# Patient Record
Sex: FEMALE | Race: ASIAN | NOT HISPANIC OR LATINO | Employment: FULL TIME | URBAN - METROPOLITAN AREA
[De-identification: names, ages, dates, MRNs, and addresses within clinical notes are randomized per-mention and may not be internally consistent; named-entity substitution may affect disease eponyms.]

---

## 2020-10-27 ENCOUNTER — OFFICE VISIT (OUTPATIENT)
Dept: OBGYN | Facility: CLINIC | Age: 30
End: 2020-10-27
Payer: COMMERCIAL

## 2020-10-27 VITALS
HEART RATE: 68 BPM | WEIGHT: 104.4 LBS | SYSTOLIC BLOOD PRESSURE: 86 MMHG | BODY MASS INDEX: 19.71 KG/M2 | HEIGHT: 61 IN | DIASTOLIC BLOOD PRESSURE: 50 MMHG

## 2020-10-27 DIAGNOSIS — R10.2 FEMALE PELVIC PAIN: Primary | ICD-10-CM

## 2020-10-27 PROCEDURE — 99203 OFFICE O/P NEW LOW 30 MIN: CPT | Performed by: OBSTETRICS & GYNECOLOGY

## 2020-10-27 RX ORDER — NORGESTIMATE AND ETHINYL ESTRADIOL 0.25-0.035
1 KIT ORAL DAILY
Qty: 90 TABLET | Refills: 4 | Status: SHIPPED | OUTPATIENT
Start: 2020-10-27 | End: 2021-10-19 | Stop reason: SINTOL

## 2020-10-27 SDOH — HEALTH STABILITY: MENTAL HEALTH: HOW OFTEN DO YOU HAVE 6 OR MORE DRINKS ON ONE OCCASION?: NOT ASKED

## 2020-10-27 SDOH — HEALTH STABILITY: MENTAL HEALTH: HOW MANY STANDARD DRINKS CONTAINING ALCOHOL DO YOU HAVE ON A TYPICAL DAY?: NOT ASKED

## 2020-10-27 SDOH — HEALTH STABILITY: MENTAL HEALTH: HOW OFTEN DO YOU HAVE A DRINK CONTAINING ALCOHOL?: NOT ASKED

## 2020-10-27 ASSESSMENT — MIFFLIN-ST. JEOR: SCORE: 1130.94

## 2020-10-27 NOTE — PROGRESS NOTES
SUBJECTIVE:                                                   Marie Ball is a 29 year old female who presents to clinic today for the following health issue(s):  Patient presents with:  Pelvic Pain: Patient had pelvic pain and had an MRI completed through [facility]. Patient is new to our clinic and wishes to seek OBGYN regarding pelvic pain.       Additional information: Had an MRI done at [facility] on 10/5/2020.     HPI:  Patient has been having pelvic pain for 8-9 months.  She was having more menstrual cramping as well, dysmenorrhea.  She has increased cramping 2 days prior to bleeding.  She has a cycle every 27 days.  She is having bleeding for 4-5 days total including day of spotting.  She has never been on birth control.      She has a partner and not trying to get pregnant.  She has been having unprotected sex for 1-2 years.  She is having some pain with intercourse as well.     She has never been pregnant.  She has never had gyn surgery.    Patient's last menstrual period was 10/23/2020..     Patient is sexually active, No obstetric history on file..  Using none for contraception.    reports that she has never smoked. She has never used smokeless tobacco.    STD testing offered?  Declined    Health maintenance updated:  no, due for pap smear    Today's PHQ-2 Score: No flowsheet data found.  Today's PHQ-9 Score: No flowsheet data found.  Today's TAE-7 Score: No flowsheet data found.    Problem list and histories reviewed & adjusted, as indicated.  Additional history: as documented.    There is no problem list on file for this patient.    Past Surgical History:   Procedure Laterality Date     NO HISTORY OF SURGERY        Social History     Tobacco Use     Smoking status: Never Smoker     Smokeless tobacco: Never Used   Substance Use Topics     Alcohol use: Not Currently      Problem (# of Occurrences) Relation (Name,Age of Onset)    Throat cancer (1) Paternal Grandmother            No current  "outpatient medications on file.     No current facility-administered medications for this visit.      No Known Allergies    ROS:  12 point review of systems negative other than symptoms noted below or in the HPI.  Genitourinary: Pelvic Pain  No urinary frequency or dysuria, bladder or kidney problems      OBJECTIVE:     BP (!) 86/50 (BP Location: Right arm, Patient Position: Sitting, Cuff Size: Adult Regular)   Pulse 68   Ht 1.549 m (5' 1\")   Wt 47.4 kg (104 lb 6.4 oz)   LMP 10/23/2020   Breastfeeding No   BMI 19.73 kg/m    Body mass index is 19.73 kg/m .    Exam:  Constitutional:  Appearance: Well nourished, well developed alert, in no acute distress  Chest:  Respiratory Effort:  Breathing unlabored.   Cardiovascular: no edema    In-Clinic Test Results:  No results found for this or any previous visit (from the past 24 hour(s)).    ASSESSMENT/PLAN:                                                        ICD-10-CM    1. Female pelvic pain  R10.2 US Transvaginal Non OB       There are no Patient Instructions on file for this visit.    1. US to reassess her cyst that was present in September.  MRI was concerning for possible endometriosis/endometrioma.  She isn't on birth control    2. Discussed endometriosis and long term follow up.  Discussed infertility and tubal structures.   Discussed birth control suppression to help with pain and long term.  Discussed  Keeping on birth control until she desires conception.  Discussed increased risk of ectopic and pregnancy.          Kika Breen MD  [facility] [place]  "

## 2020-11-06 ENCOUNTER — NURSE TRIAGE (OUTPATIENT)
Dept: NURSING | Facility: CLINIC | Age: 30
End: 2020-11-06

## 2020-11-06 NOTE — TELEPHONE ENCOUNTER
10/27/20 OV w Dr Breen:   2. Discussed endometriosis and long term follow up.  Discussed infertility and tubal structures.   Discussed birth control suppression to help with pain and long term.  Discussed  Keeping on birth control until she desires conception.  Discussed increased risk of ectopic and pregnancy.   Kika Breen MD    Called and left a detailed vm that Dr Breen intended continuous suppression to help w pain. So take active pills daily, skipping the placebo week.  Call w questions.  Geno Perry RN on 11/6/2020 at 12:05 PM

## 2020-11-06 NOTE — TELEPHONE ENCOUNTER
RN triage   Call from pt   Pt states she was seen last week and prescribed medication and told to start medication on Sunday morning   Pt started medication on Sunday evening 11/2 -- and taking med Q day in the evening   Pt states the pharmacist asked her if she is supposed to take this medication when she is having her period  Pt wants to know if it is OK to take med when having her period?    Pt states also = has had nausea since taking medication -- off and on -- when thinking about certain foods -- no vomiting   Pt states she is drinking fluids and U.O. OK   Please advise   Daphney Doran RN  [facility]  Triage Nurse Advisor      Additional Information    Caller has NON-URGENT medication question about med that PCP prescribed and triager unable to answer question    Protocols used: MEDICATION QUESTION CALL-A-[place]

## 2020-11-19 NOTE — PROGRESS NOTES
"  SUBJECTIVE:                                                   Marie Ball is a 30 year old female who presents to clinic today for the following health issue(s):  Patient presents with:  Ultrasound: follow-up      HPI:  Patient with history of endometriosis.  She was having persistent pelvic pain and not on suppression.  She had a persistent endometrioma on the left ovary.  She hasn't had tubal evaluation.  She isn't trying for pregnancy at this time.      She had some nausea with the ocps.  She isn't having any other side effects at this time.      Patient's last menstrual period was 10/23/2020..   Patient is sexually active, .  Using oral contraceptive for contraception.    reports that she has never smoked. She has never used smokeless tobacco.  Health maintenance updated:  Due for Pap    Today's PHQ-2 Score: No flowsheet data found.  Today's PHQ-9 Score: No flowsheet data found.  Today's TAE-7 Score: No flowsheet data found.    Problem list and histories reviewed & adjusted, as indicated.  Additional history: as documented.    There is no problem list on file for this patient.    Past Surgical History:   Procedure Laterality Date     NO HISTORY OF SURGERY        Social History     Tobacco Use     Smoking status: Never Smoker     Smokeless tobacco: Never Used   Substance Use Topics     Alcohol use: Not Currently      Problem (# of Occurrences) Relation (Name,Age of Onset)    Throat cancer (1) Paternal Grandmother            Current Outpatient Medications   Medication Sig     norgestimate-ethinyl estradiol (SPRINTEC 28) 0.25-35 MG-MCG tablet Take 1 tablet by mouth daily     No current facility-administered medications for this visit.      No Known Allergies    ROS:  12 point review of systems negative other than symptoms noted below or in the HPI.  Genitourinary: Pelvic Pain  No urinary frequency or dysuria, bladder or kidney problems      OBJECTIVE:     BP 90/60   Ht 1.549 m (5' 1\")   Wt 46.7 kg " (103 lb)   LMP 10/23/2020   BMI 19.46 kg/m    Body mass index is 19.46 kg/m .    Exam:  Constitutional:  Appearance: Well nourished, well developed alert, in no acute distress  Chest:  Respiratory Effort:  Breathing unlabored.  Cardiovascular: Heart: Auscultation: no edema    In-Clinic Test Results:  Results for orders placed or performed in visit on 10/27/20 (from the past 24 hour(s))   US Transvaginal Non OB    Narrative    Gynecological Ultrasound Report  Pelvic U/S - Transvaginal  [facility]  Referring Provider: Kika Villareal MD  Sonographer:  Tonja Medina RDMS  Indication: Pain- Pelvic pain Right  LMP: Patient's last menstrual period was 10/23/2020.  History:   Gynecological Ultrasonography:   Uterus: anteverted. Contour is smooth/regular.  Size: 5.60 x 3.86 x 3.26 cm  Endometrium: Thickness Total 5.96 mm  Findings: Normal  Right Ovary: 1.84 x 1.62 x .96 cm. Wnl  Left Ovary: 2.56 x 2.33 x 1.99 cm. Left hyperechoic mass possible   endometrioma 1.9x 1.9x 1.7cm  Cul de Sac Free Fluid: No free fluid     Impression:    Possible endometrioma, <2 cm    Kika Breen MD             ASSESSMENT/PLAN:                                                        ICD-10-CM    1. Female pelvic pain  R10.2        There are no Patient Instructions on file for this visit.    1 History of endometriosis- she started on ocps at this time.  Discussed her ultrasound findings.  She has a persistent endometrioma.  Discussed the suppression will help with her endometriosis  2. Discussed fallopian tubes and infertility.  Discussed that if she can't conceive then we would check the fallopian tubes with HSG and would refer to IVF before doing in clinic cycles due to her history of endometriosis.    3. Discussed follow up planning.    Kika Breen MD  [facility] [place]

## 2020-11-20 ENCOUNTER — OFFICE VISIT (OUTPATIENT)
Dept: OBGYN | Facility: CLINIC | Age: 30
End: 2020-11-20
Attending: OBSTETRICS & GYNECOLOGY
Payer: COMMERCIAL

## 2020-11-20 ENCOUNTER — ANCILLARY PROCEDURE (OUTPATIENT)
Dept: ULTRASOUND IMAGING | Facility: CLINIC | Age: 30
End: 2020-11-20
Attending: OBSTETRICS & GYNECOLOGY
Payer: COMMERCIAL

## 2020-11-20 VITALS
DIASTOLIC BLOOD PRESSURE: 60 MMHG | BODY MASS INDEX: 19.45 KG/M2 | SYSTOLIC BLOOD PRESSURE: 90 MMHG | WEIGHT: 103 LBS | HEIGHT: 61 IN

## 2020-11-20 DIAGNOSIS — R10.2 FEMALE PELVIC PAIN: Primary | ICD-10-CM

## 2020-11-20 PROCEDURE — 76830 TRANSVAGINAL US NON-OB: CPT | Performed by: OBSTETRICS & GYNECOLOGY

## 2020-11-20 PROCEDURE — 99213 OFFICE O/P EST LOW 20 MIN: CPT | Performed by: OBSTETRICS & GYNECOLOGY

## 2020-11-20 ASSESSMENT — MIFFLIN-ST. JEOR: SCORE: 1119.58

## 2020-11-23 ENCOUNTER — TELEPHONE (OUTPATIENT)
Dept: OBGYN | Facility: CLINIC | Age: 30
End: 2020-11-23

## 2020-11-23 NOTE — TELEPHONE ENCOUNTER
Pt stating that she is having some bleeding after starting new OCP 3 weeks ago.  Not sure if it's a real period as it's lighter than what she normally has.    Informed pt that break through bleeding is common in the first 3 months when starting a new OCP.  It will take about that long for the body to adjust.    Told pt to call if still having breakthrough bleeding after having been taking this OCP after 3 months.    Pt verbalized understanding, in agreement with plan, and voiced no further questions.    Nora Blair RN on 11/23/2020 at 2:16 PM

## 2020-11-23 NOTE — TELEPHONE ENCOUNTER
Patient is experiencing a period after being on birth control for 3 weeks.  She is wondering if that is normal.  Please give her a call back.    Thank you

## 2021-01-09 ENCOUNTER — HEALTH MAINTENANCE LETTER (OUTPATIENT)
Age: 31
End: 2021-01-09

## 2021-04-05 NOTE — PROGRESS NOTES
Marie Ball is a 30 year old female who is being evaluated via a billable telephone visit.      What phone number would you like to be contacted at? [phone]  How would you like to obtain your AVS? Nayeli      SUBJECTIVE:                                                   Marie Ball is a 30 year old female who presents for virtual visit today for the following health issue(s):  Patient presents with:  Follow Up: discuss cysts and OCP's      HPI:  Patient reports that she has been on ocps. She has been getting her cycles on the ocps.  She has been on them for 6 months.  She is taking the placebo week.    She has history of endometrioma.  She was put on ocps for control.      Her cycles have been better controlled.  She has had lighter cycles.  She reports that her pain and cramping was improved.    She would like to conceive soon.                No LMP recorded..   Patient is sexually active, .  Using oral contraceptives for contraception.    reports that she has never smoked. She has never used smokeless tobacco.  Health maintenance updated:  Due for Pap    Today's PHQ-2 Score: No flowsheet data found.  Today's PHQ-9 Score: No flowsheet data found.  Today's TAE-7 Score: No flowsheet data found.    Problem list and histories reviewed & adjusted, as indicated.  Additional history: as documented.    There is no problem list on file for this patient.    Past Surgical History:   Procedure Laterality Date     NO HISTORY OF SURGERY        Social History     Tobacco Use     Smoking status: Never Smoker     Smokeless tobacco: Never Used   Substance Use Topics     Alcohol use: Not Currently      Problem (# of Occurrences) Relation (Name,Age of Onset)    Throat cancer (1) Paternal Grandmother            Current Outpatient Medications   Medication Sig     norgestimate-ethinyl estradiol (SPRINTEC 28) 0.25-35 MG-MCG tablet Take 1 tablet by mouth daily     No current facility-administered medications for this  visit.      No Known Allergies      OBJECTIVE:     No vitals were obtained today due to virtual visit.    Physical Exam  GENERAL: mentation appropriate        ASSESSMENT/PLAN:                                                      Phone call duration: 20 minutes      ICD-10-CM    1. Female pelvic pain  R10.2    2. Endometrioma  N80.9 US Transvaginal Non OB   3. Encounter for birth control pills maintenance  Z30.41        There are no Patient Instructions on file for this visit.    1. Birth control- discussed can skip the placebo week, and will possibly prevent bleeding.    2. Endometriosis- she has improved pain on the pills.  We will re-image the endometrioma on pelvic ultrasound.  Previously had discussed infertility evaluation, to assess her tubes when she wants to conceive.  Discussed HSG,  Discussed why we would do that with endometriosis.  Discussed sometimes the tubes can be blocked and can evaluate them that way.  3.  We will have her come in in the next 2-3 months will check ovaries.  Reviewed her records.    4. Discussed endometriosis and cyst sizes.  Discussed endometriosis and pregnancy.  Typically people have less symptoms.  Discussed timing of stopping pills and intercourse.  Discussed intercourse and timing for conception.    Kika Breen MD  [facility] [place]

## 2021-04-06 ENCOUNTER — VIRTUAL VISIT (OUTPATIENT)
Dept: OBGYN | Facility: CLINIC | Age: 31
End: 2021-04-06
Payer: COMMERCIAL

## 2021-04-06 DIAGNOSIS — Z30.41 ENCOUNTER FOR BIRTH CONTROL PILLS MAINTENANCE: ICD-10-CM

## 2021-04-06 DIAGNOSIS — R10.2 FEMALE PELVIC PAIN: Primary | ICD-10-CM

## 2021-04-06 DIAGNOSIS — N80.129 ENDOMETRIOMA: ICD-10-CM

## 2021-04-06 PROCEDURE — 99213 OFFICE O/P EST LOW 20 MIN: CPT | Mod: 95 | Performed by: OBSTETRICS & GYNECOLOGY

## 2021-07-01 NOTE — PROGRESS NOTES
SUBJECTIVE:                                                   Marie Ball is a 30 year old female who presents to clinic today for the following health issue(s):  Patient presents with:  Ultrasound: follow-up    Additional information: patient is taking pill continuously and has been having spotting    HPI:  Patient with endometrioma.  She is on birth control pills, she is having regular spotting on the pills.  She was taking the sprintec.    Ultrasound today showed stable endometrioma.    Her  and her are planning to try to conceive in a year or so.  She is thinking quarter 1-2.        No LMP recorded. (Menstrual status: Irregular Periods)..   Patient is sexually active, .  Using oral contraceptives for contraception.   reports that she has never smoked. She has never used smokeless tobacco.  Health maintenance updated:  Needs annual exam with pap    Today's PHQ-2 Score:   PHQ-2 (  [employer]) 2021   Q1: Little interest or pleasure in doing things 0   Q2: Feeling down, depressed or hopeless 0   PHQ-2 Score 0     Problem list and histories reviewed & adjusted, as indicated.  Additional history: as documented.    There is no problem list on file for this patient.    Past Surgical History:   Procedure Laterality Date     NO HISTORY OF SURGERY        Social History     Tobacco Use     Smoking status: Never Smoker     Smokeless tobacco: Never Used   Substance Use Topics     Alcohol use: Not Currently      Problem (# of Occurrences) Relation (Name,Age of Onset)    Throat cancer (1) Paternal Grandmother            Current Outpatient Medications   Medication Sig     levonorgestrel-ethinyl estradiol (SEASONALE) 0.15-0.03 MG tablet Take 1 tablet by mouth daily     norgestimate-ethinyl estradiol (SPRINTEC 28) 0.25-35 MG-MCG tablet Take 1 tablet by mouth daily     No current facility-administered medications for this visit.      No Known Allergies    ROS:  12 point review of systems negative other than  "symptoms noted below or in the HPI.  Genitourinary: Irregular Menses  No urinary frequency or dysuria, bladder or kidney problems      OBJECTIVE:     /60   Ht 1.549 m (5' 1\")   Wt 51.3 kg (113 lb)   BMI 21.35 kg/m    Body mass index is 21.35 kg/m .    Exam:  Constitutional:  Appearance: Well nourished, well developed alert, in no acute distress     In-Clinic Test Results:  Results for orders placed or performed in visit on 04/06/21 (from the past 24 hour(s))   US Transvaginal Non OB    Narrative    Gynecological Ultrasound Report  Pelvic U/S - Transvaginal  [facility]  Referring Provider: Kika Breen MD  Sonographer:  Nain Steel RDMS  Indication: Endometrioma and pelvic pain  LMP: Patient unsure of LMP - still bleeding  History:   Gynecological Ultrasonography:   Uterus: anteverted. Contour is smooth/regular.  Size: 5.8 x 3.9 x 3.1 cm  Endometrium: Thickness Total 2.2 mm  Findings:   Right Ovary: 1.2 x 1.4 x 1.9 cm. Wnl  Left Ovary: 2.3 x 2.5 x 2.0 cm. Probable endometrioma 1.9 x 1.5 x 1.8 cm  Cul de Sac Free Fluid: No free fluid     Impression:    Stable cyst, likely endometrioma     Kika Breen MD               ASSESSMENT/PLAN:                                                        ICD-10-CM    1. Endometrioma of ovary  N80.1 levonorgestrel-ethinyl estradiol (SEASONALE) 0.15-0.03 MG tablet     US Transvaginal Non OB       There are no Patient Instructions on file for this visit.    1. Endometrioma- stable on ultrasound at this time  2. She is taking the ocps continuously at this time.    3. Discussed when she stops pills, can try to get pregnant that next month  4. Discussed possible HSG.    5. Discussed she could try for conception for a couple months, then she will call and consider tubal patency testing  6. Discussed could do another us when she desires conception.        Kika Breen MD  [facility] [place]  "

## 2021-07-02 ENCOUNTER — OFFICE VISIT (OUTPATIENT)
Dept: OBGYN | Facility: CLINIC | Age: 31
End: 2021-07-02
Attending: OBSTETRICS & GYNECOLOGY
Payer: COMMERCIAL

## 2021-07-02 ENCOUNTER — ANCILLARY PROCEDURE (OUTPATIENT)
Dept: ULTRASOUND IMAGING | Facility: CLINIC | Age: 31
End: 2021-07-02
Attending: OBSTETRICS & GYNECOLOGY
Payer: COMMERCIAL

## 2021-07-02 VITALS
WEIGHT: 113 LBS | HEIGHT: 61 IN | BODY MASS INDEX: 21.34 KG/M2 | DIASTOLIC BLOOD PRESSURE: 60 MMHG | SYSTOLIC BLOOD PRESSURE: 102 MMHG

## 2021-07-02 DIAGNOSIS — N80.129 ENDOMETRIOMA OF OVARY: Primary | ICD-10-CM

## 2021-07-02 PROCEDURE — 99213 OFFICE O/P EST LOW 20 MIN: CPT | Performed by: OBSTETRICS & GYNECOLOGY

## 2021-07-02 PROCEDURE — 76830 TRANSVAGINAL US NON-OB: CPT | Performed by: OBSTETRICS & GYNECOLOGY

## 2021-07-02 RX ORDER — LEVONORGESTREL AND ETHINYL ESTRADIOL 0.15-0.03
1 KIT ORAL DAILY
Qty: 90 TABLET | Refills: 3 | Status: SHIPPED | OUTPATIENT
Start: 2021-07-02 | End: 2021-10-19 | Stop reason: SINTOL

## 2021-07-02 ASSESSMENT — MIFFLIN-ST. JEOR: SCORE: 1164.94

## 2021-10-11 ENCOUNTER — HEALTH MAINTENANCE LETTER (OUTPATIENT)
Age: 31
End: 2021-10-11

## 2021-10-18 NOTE — PROGRESS NOTES
SUBJECTIVE:                                                   Marie Ball is a 30 year old female who presents to clinic today for the following health issue(s):  No chief complaint on file.      HPI:  Patient following up on her ultrasound.  She has history of endometrioma. She has persistent pain on the right side.  Sometimes the pain will wax and wane.  She doesn't have pain on the left.     She was seen left side evulsion fracture.  She was healing with her ankle, however, she was bed ridden.  She had left popliteal vein thrombosis.  She was started on Eliquis 10 mg BID at this time.      No LMP recorded. (Menstrual status: Irregular Periods)..   Patient is sexually active, .  Using nothing for contraception.    reports that she has never smoked. She has never used smokeless tobacco.      Today's PHQ-2 Score:   PHQ-2 (  [employer]) 2021   Q1: Little interest or pleasure in doing things 0   Q2: Feeling down, depressed or hopeless 0   PHQ-2 Score 0     Today's PHQ-9 Score: No flowsheet data found.  Today's TAE-7 Score: No flowsheet data found.    Problem list and histories reviewed & adjusted, as indicated.  Additional history: as documented.    There is no problem list on file for this patient.    Past Surgical History:   Procedure Laterality Date     NO HISTORY OF SURGERY        Social History     Tobacco Use     Smoking status: Never Smoker     Smokeless tobacco: Never Used   Substance Use Topics     Alcohol use: Not Currently      Problem (# of Occurrences) Relation (Name,Age of Onset)    Throat cancer (1) Paternal Grandmother            No current outpatient medications on file.     No current facility-administered medications for this visit.     No Known Allergies    ROS:  12 point review of systems negative other than symptoms noted below or in the HPI.  No urinary frequency or dysuria, bladder or kidney problems      OBJECTIVE:     There were no vitals taken for this visit.  There is no  height or weight on file to calculate BMI.    Exam:  Constitutional:  Appearance: Well nourished, well developed alert, in no acute distress     In-Clinic Test Results:  Results for orders placed or performed in visit on 10/19/21 (from the past 24 hour(s))   US Transvaginal Non OB    Narrative    Gynecological Ultrasound Report  Pelvic U/S - Transvaginal  [facility]  Referring Provider: Kika Villareal MD  Sonographer:  Tonja Medina RDMS  Indication: F/U left endometrioma  LMP: 10/10/21  History:   Gynecological Ultrasonography:   Uterus: anteverted. Contour is smooth/regular.  Size: 5.89 x 4.12 x 3.01 cm  Endometrium: Thickness Total 4.62 mm  Findings: Normal  Right Ovary: 2.72 x 1.40 x 1.45 cm. Wnl  Left Ovary: 2.89 x 2.43 x 2.07 cm. Left endometrioma 2x 1.6x 1.7cm  Cul de Sac Free Fluid: No free fluid     Impression:       Left endometrioma, overall volume appears stable. (previously: 1.9 x 1.5   x 1.8 cm)    Kika Breen MD       ASSESSMENT/PLAN:                                                        ICD-10-CM    1. Endometrioma of ovary  N80.1    2. Acute deep vein thrombosis of left popliteal vein (H)  I82.432          1. She is on eliquis for her DVT at this time.  Her family doctor will follow that.  She will need to be re-imaged, and then likely follow up with [facility] onc.  Referral sent to the [facility] doctors.  2. Discussed stopping all estrogen related products.  Discussed increasing risk of clot  3. Discussed genetic testing and labs today to rule out other reasons for the DVT  4. Discussed changing medication.  Can't be on estrogen at this time.  Consider progesterone only birth control pills.  Discussed due to endometriosis suppressing ovulation  5. Once they get clearance from the [facility] and then planning on starting to try.      Kika Breen MD  [facility] [place]

## 2021-10-19 ENCOUNTER — OFFICE VISIT (OUTPATIENT)
Dept: OBGYN | Facility: CLINIC | Age: 31
End: 2021-10-19
Attending: OBSTETRICS & GYNECOLOGY
Payer: COMMERCIAL

## 2021-10-19 ENCOUNTER — ANCILLARY PROCEDURE (OUTPATIENT)
Dept: ULTRASOUND IMAGING | Facility: CLINIC | Age: 31
End: 2021-10-19
Attending: OBSTETRICS & GYNECOLOGY
Payer: COMMERCIAL

## 2021-10-19 DIAGNOSIS — N80.129 ENDOMETRIOMA OF OVARY: Primary | ICD-10-CM

## 2021-10-19 DIAGNOSIS — I82.432 ACUTE DEEP VEIN THROMBOSIS OF LEFT POPLITEAL VEIN (H): ICD-10-CM

## 2021-10-19 DIAGNOSIS — N80.129 ENDOMETRIOMA OF OVARY: ICD-10-CM

## 2021-10-19 LAB
FACTOR 2 INTERPRETATION: NORMAL
FACTOR V INTERPRETATION: NORMAL
LAB DIRECTOR COMMENTS: NORMAL
LAB DIRECTOR DISCLAIMER: NORMAL
LAB DIRECTOR INTERPRETATION: NORMAL
LAB DIRECTOR METHODOLOGY: NORMAL
LAB DIRECTOR RESULTS: NORMAL
SPECIMEN DESCRIPTION: NORMAL

## 2021-10-19 PROCEDURE — 81240 F2 GENE: CPT | Performed by: OBSTETRICS & GYNECOLOGY

## 2021-10-19 PROCEDURE — 76830 TRANSVAGINAL US NON-OB: CPT | Performed by: OBSTETRICS & GYNECOLOGY

## 2021-10-19 PROCEDURE — G0452 MOLECULAR PATHOLOGY INTERPR: HCPCS | Performed by: PATHOLOGY

## 2021-10-19 PROCEDURE — 85300 ANTITHROMBIN III ACTIVITY: CPT | Performed by: OBSTETRICS & GYNECOLOGY

## 2021-10-19 PROCEDURE — 81241 F5 GENE: CPT | Performed by: OBSTETRICS & GYNECOLOGY

## 2021-10-19 PROCEDURE — 86147 CARDIOLIPIN ANTIBODY EA IG: CPT | Performed by: OBSTETRICS & GYNECOLOGY

## 2021-10-19 PROCEDURE — 99213 OFFICE O/P EST LOW 20 MIN: CPT | Mod: 25 | Performed by: OBSTETRICS & GYNECOLOGY

## 2021-10-19 PROCEDURE — 36415 COLL VENOUS BLD VENIPUNCTURE: CPT | Performed by: OBSTETRICS & GYNECOLOGY

## 2021-10-20 ENCOUNTER — TELEPHONE (OUTPATIENT)
Dept: OBGYN | Facility: CLINIC | Age: 31
End: 2021-10-20

## 2021-10-20 LAB — AT III ACT/NOR PPP CHRO: 118 % (ref 85–135)

## 2021-10-20 NOTE — TELEPHONE ENCOUNTER
Call from Sol at the [facility] special coagulation lab - wants to inform us that he will be cancelling the lupus anticoagulant test b/c she has an interfering medication Apixaban    Callback for questions, can ask for Sol : [phone]

## 2021-10-22 LAB
CARDIOLIPIN IGA SER IA-ACNC: 1.6 APL-U/ML
CARDIOLIPIN IGA SER IA-ACNC: NEGATIVE

## 2022-03-01 ENCOUNTER — VIRTUAL VISIT (OUTPATIENT)
Dept: OBGYN | Facility: CLINIC | Age: 32
End: 2022-03-01
Payer: COMMERCIAL

## 2022-03-01 DIAGNOSIS — N80.129 ENDOMETRIOMA: Primary | ICD-10-CM

## 2022-03-01 DIAGNOSIS — Z82.49 FAMILY HISTORY OF DEEP VENOUS THROMBOSIS: ICD-10-CM

## 2022-03-01 PROCEDURE — 99214 OFFICE O/P EST MOD 30 MIN: CPT | Mod: TEL | Performed by: OBSTETRICS & GYNECOLOGY

## 2022-03-01 RX ORDER — MULTIPLE VITAMINS W/ MINERALS TAB 9MG-400MCG
1 TAB ORAL DAILY
COMMUNITY
End: 2023-01-10

## 2022-03-01 NOTE — PROGRESS NOTES
Marie Ball is a 31 year old female who is being evaluated via a billable telephone visit.      What phone number would you like to be contacted at? [phone]  How would you like to obtain your AVS? Nayeli      SUBJECTIVE:                                                   Marie Ball is a 31 year old female who presents for virtual visit today for the following health issue(s):  Patient presents with:  Follow Up: patient has been cleared from previous DVT and off medication. Would like to discuss next plans for a repeat ultrasound. She is leaving the country for over a month soon.      HPI:  Patient presenting for medication follow up.  She has been followed for DVT.  She was just cleared by the anticoagulation clinic and is off the blood thinners at this time.  She has been off the medication for a month.    She reports that she is having some pelvic pain.  She has known history of endometriosis.  Discussed other birth control options with progesterone only previously, she declines unless absolutely necessary.     She is traveling this Friday.             No LMP recorded.. Unknown to patient, but states she is having regular monthly cycles.  Patient is sexually active, .  Using nothing for contraception   reports that she has never smoked. She has never used smokeless tobacco.  Health maintenance updated:  Due for pap    Today's PHQ-2 Score:   PHQ-2 (  [employer]) 2021   Q1: Little interest or pleasure in doing things 0   Q2: Feeling down, depressed or hopeless 0   PHQ-2 Score 0   PHQ-2 Total Score (12-17 Years)- Positive if 3 or more points; Administer PHQ-A if positive 0     Today's PHQ-9 Score: No flowsheet data found.  Today's TAE-7 Score: No flowsheet data found.    Problem list and histories reviewed & adjusted, as indicated.  Additional history: as documented.    There is no problem list on file for this patient.    Past Surgical History:   Procedure Laterality Date     NO HISTORY OF  SURGERY        Social History     Tobacco Use     Smoking status: Never Smoker     Smokeless tobacco: Never Used   Substance Use Topics     Alcohol use: Not Currently      Problem (# of Occurrences) Relation (Name,Age of Onset)    Throat cancer (1) Paternal Grandmother            Current Outpatient Medications   Medication Sig     multivitamin w/minerals (MULTI-VITAMIN) tablet Take 1 tablet by mouth daily     Omega-3 Fatty Acids (FISH OIL PO)      No current facility-administered medications for this visit.     No Known Allergies      OBJECTIVE:     No vitals were obtained today due to virtual visit.    Physical Exam  GENERAL: mentation appropriate          ASSESSMENT/PLAN:                                                      Phone call duration: 5 minutes 34 seconds, total time reviewing chart and charting 8 minutes      ICD-10-CM    1. Endometrioma  N80.9 US Transvaginal Pelvic Non-OB   2. Family history of deep venous thrombosis  Z82.49 Lupus Anticoagulant Panel     Cardiolipin Heather IgG and IgM     Beta 2 Glycoprotein Antibodies IGG IGM       1. Ultrasound for evaluation of her ovaries ordered  2. Antiphospholipid antibody screening when she returns and off the blood thinners.   3. She has been on estrogen containing birth control before, since the DVT she can't be on this medication.  4. Consider progesterone only birth control.  Discussed with patient.  She would like to avoid at this time.      Kika Breen MD  [facility] [place]

## 2022-03-04 ENCOUNTER — TELEPHONE (OUTPATIENT)
Dept: OBGYN | Facility: CLINIC | Age: 32
End: 2022-03-04
Payer: COMMERCIAL

## 2022-03-04 NOTE — TELEPHONE ENCOUNTER
Patient asked to speak with a nurse regarding the suggestion to take baby Asprin. She was able to find Myles Asprin at the store at [employer] and was wondering if that would be the same? Also asked how long before the flight she should take it? Please call back, okay to leave a voicemail.

## 2022-03-07 NOTE — TELEPHONE ENCOUNTER
LMTCB    Geno Perry RN on 3/7/2022 at 8:58 AM    LMTCB-informed via message with recommendations  Rachel Mathias RN on 3/8/2022 at 4:24 PM

## 2022-03-27 ENCOUNTER — HEALTH MAINTENANCE LETTER (OUTPATIENT)
Age: 32
End: 2022-03-27

## 2022-05-17 NOTE — PROGRESS NOTES
SUBJECTIVE:                                                   Marie Ball is a 31 year old female who presents to clinic today for the following health issue(s):  Patient presents with:  Ultrasound: Endometrioma and left ovarian cyst    HPI:  Marie presents for an ultrasound to continue to follow up on her left known endometrioma previously diagnosed by Dr. Breen. She and her partner are not ready to start trying to conceive at this time but will start in December. She is not on OCPs as she developed a DVT while on them in the past. She plans to start trying to conceive in December. She has never previously discussed surgical intervention for her endometrioma.    Patient's last menstrual period was 2022..     Patient is sexually active, .  Using none for contraception.    reports that she has never smoked. She has never used smokeless tobacco.    STD testing offered?  Declined    Health maintenance updated:  Due for pap    Today's PHQ-2 Score:   PHQ-2 (  [employer]) 2021   Q1: Little interest or pleasure in doing things 0   Q2: Feeling down, depressed or hopeless 0   PHQ-2 Score 0   PHQ-2 Total Score (12-17 Years)- Positive if 3 or more points; Administer PHQ-A if positive 0     Today's PHQ-9 Score: No flowsheet data found.  Today's TAE-7 Score: No flowsheet data found.    Problem list and histories reviewed & adjusted, as indicated.  Additional history: as documented.    There is no problem list on file for this patient.    Past Surgical History:   Procedure Laterality Date     NO HISTORY OF SURGERY        Social History     Tobacco Use     Smoking status: Never Smoker     Smokeless tobacco: Never Used   Substance Use Topics     Alcohol use: Not Currently      Problem (# of Occurrences) Relation (Name,Age of Onset)    Throat cancer (1) Paternal Grandmother            Current Outpatient Medications   Medication Sig     multivitamin w/minerals (THERA-VIT-M) tablet Take 1 tablet by mouth  "daily     Omega-3 Fatty Acids (FISH OIL PO)      No current facility-administered medications for this visit.     No Known Allergies    ROS:  12 point review of systems negative other than symptoms noted below or in the HPI.  No urinary frequency or dysuria, bladder or kidney problems      OBJECTIVE:     /54   Ht 1.549 m (5' 1\")   Wt 53.1 kg (117 lb)   LMP 05/22/2022   BMI 22.11 kg/m    Body mass index is 22.11 kg/m .    Exam:  Constitutional:  Appearance: Well nourished, well developed alert, in no acute distress  Neurologic:  Mental Status:  Oriented X3.  Normal strength and tone, sensory exam grossly normal, mentation intact and speech normal.    Psychiatric:  Mentation appears normal and affect normal/bright.     In-Clinic Test Results:  Results for orders placed or performed in visit on 05/23/22 (from the past 24 hour(s))   US Transvaginal Pelvic Non-OB    Narrative    US Transvaginal Pelvic Non-OB  Order #: [id number] Accession #: [id number]      Study Notes       Spurling, Brittnee on 5/23/2022  1:41 PM      Gynecological Ultrasound Report  Pelvic U/S - Transvaginal  [facility]  Referring Provider: Amira Schmidt MD   Sonographer:  Brittnee Spurling, RDMS  Indication: Endometrioma  LMP: No LMP recorded.     Gynecological Ultrasonography:   Uterus: anteverted. Contour is smooth/regular.  Size: 5.7 x 3.9 x 3.1 cm  Endometrium: Thickness Total 3.2 mm  Findings: right adnexal isoechoic mass: 1.9 x 0.8 x 0.7 cm  Right Ovary: 2.4 x 1.8 x 1.4 cm. Wnl  Left Ovary: 2.6 x 3.3 x 2.0 cm. Complex cyst 1.7 x 1.0 x 2.0 cm  Cul de Sac Free Fluid: No free fluid     Impression:    Anteverted uterus without myometrial or endometrial masses. Left ovary   with stable known endometrioma. There is a right paraovarian mass with   measurements above likely tubal in origin.    Amira Schmidt MD               ASSESSMENT/PLAN:                                                        ICD-10-CM  "   1. Left ovarian cyst  N83.202      Her ultrasound images were reviewed and were discussed in person today. The new right paraovarian mass is likely tubal in origin. We discussed that if she has any difficulty in conceiving after 6 cycles of timed intercourse it is worth considering an operative laparoscopy for a left cystectomy and for removal of endometriosis lesions. At the time her right adnexa will be evaluated as well.     Amira Schmidt MD  [facility] [place]

## 2022-05-23 ENCOUNTER — OFFICE VISIT (OUTPATIENT)
Dept: OBGYN | Facility: CLINIC | Age: 32
End: 2022-05-23
Attending: OBSTETRICS & GYNECOLOGY

## 2022-05-23 ENCOUNTER — ANCILLARY PROCEDURE (OUTPATIENT)
Dept: ULTRASOUND IMAGING | Facility: CLINIC | Age: 32
End: 2022-05-23
Attending: OBSTETRICS & GYNECOLOGY
Payer: COMMERCIAL

## 2022-05-23 VITALS
WEIGHT: 117 LBS | BODY MASS INDEX: 22.09 KG/M2 | HEIGHT: 61 IN | SYSTOLIC BLOOD PRESSURE: 100 MMHG | DIASTOLIC BLOOD PRESSURE: 54 MMHG

## 2022-05-23 DIAGNOSIS — N80.129 ENDOMETRIOMA: ICD-10-CM

## 2022-05-23 DIAGNOSIS — N83.202 LEFT OVARIAN CYST: Primary | ICD-10-CM

## 2022-05-23 PROCEDURE — 99213 OFFICE O/P EST LOW 20 MIN: CPT | Mod: 25 | Performed by: OBSTETRICS & GYNECOLOGY

## 2022-05-23 PROCEDURE — 76830 TRANSVAGINAL US NON-OB: CPT | Performed by: OBSTETRICS & GYNECOLOGY

## 2022-09-24 ENCOUNTER — HEALTH MAINTENANCE LETTER (OUTPATIENT)
Age: 32
End: 2022-09-24

## 2022-12-07 ENCOUNTER — TELEPHONE (OUTPATIENT)
Dept: OBGYN | Facility: CLINIC | Age: 32
End: 2022-12-07

## 2022-12-07 DIAGNOSIS — N80.129 ENDOMETRIOMA: Primary | ICD-10-CM

## 2022-12-07 NOTE — TELEPHONE ENCOUNTER
lmp 11/25/22    Last OV 5/23/22:  Her ultrasound images were reviewed and were discussed in person today. The new right paraovarian mass is likely tubal in origin. We discussed that if she has any difficulty in conceiving after 6 cycles of timed intercourse it is worth considering an operative laparoscopy for a left cystectomy and for removal of endometriosis lesions. At the time her right adnexa will be evaluated as well.   US 5/23/22:  Impression:    Anteverted uterus without myometrial or endometrial masses. Left ovary with stable known endometrioma. There is a right paraovarian mass with measurements above likely tubal in origin.       Continues to have ongoing intermittent pelvic pain since last OV. Previously was only on right side and now is on both. is able to do normal activities.  No abnormal bleeding. Still having monthly periods however pain is worse when has menses.    Does note has had 3 episodes since last ov where she has had pain while voiding. Will have for several minutes after and then will resolve. No other urinary or vaginal symptoms. Is drinking plenty of fluids.    Routing to . Patient would like to come in for an office visit however no upcoming appointments, avail. Any other recommendations? Ok to do an US and phone visit?    Janna Woods RN

## 2022-12-08 NOTE — TELEPHONE ENCOUNTER
Yes she needs an updated ultrasound. Can we send it to Jazmin to see if she can squeeze her in on a Wednesday or somewhere as she should have an in person exam. Jazmin copied.

## 2022-12-26 ENCOUNTER — ANCILLARY PROCEDURE (OUTPATIENT)
Dept: ULTRASOUND IMAGING | Facility: CLINIC | Age: 32
End: 2022-12-26
Attending: OBSTETRICS & GYNECOLOGY
Payer: COMMERCIAL

## 2022-12-26 ENCOUNTER — OFFICE VISIT (OUTPATIENT)
Dept: OBGYN | Facility: CLINIC | Age: 32
End: 2022-12-26
Attending: OBSTETRICS & GYNECOLOGY
Payer: COMMERCIAL

## 2022-12-26 VITALS
DIASTOLIC BLOOD PRESSURE: 56 MMHG | SYSTOLIC BLOOD PRESSURE: 98 MMHG | HEIGHT: 61 IN | BODY MASS INDEX: 23.41 KG/M2 | WEIGHT: 124 LBS

## 2022-12-26 DIAGNOSIS — N80.129 ENDOMETRIOMA OF OVARY: ICD-10-CM

## 2022-12-26 DIAGNOSIS — D25.1 INTRAMURAL LEIOMYOMA OF UTERUS: Primary | ICD-10-CM

## 2022-12-26 DIAGNOSIS — N80.129 ENDOMETRIOMA: ICD-10-CM

## 2022-12-26 PROCEDURE — 76830 TRANSVAGINAL US NON-OB: CPT | Performed by: OBSTETRICS & GYNECOLOGY

## 2022-12-26 PROCEDURE — 99214 OFFICE O/P EST MOD 30 MIN: CPT | Mod: 25 | Performed by: OBSTETRICS & GYNECOLOGY

## 2022-12-26 NOTE — PATIENT INSTRUCTIONS
Hysteroscopy to check the inside of the uterus    Laparoscopy to remove the left endometrioma and other endometriosis implants.  Also will perform tubal dye studies to check to see if the tubes are open.    None of these surgeries will actually remove the small fibroid that is in the muscle of the uterus.

## 2022-12-26 NOTE — PROGRESS NOTES
SUBJECTIVE:                                                   Marie Ball is a 32 year old female who presents to clinic today for the following health issue(s):  Patient presents with:  Ultrasound: F/u to endometrioma    HPI:  Marie presents for follow up. She has known endometriosis and has a left ovarian endometrioma that has been relatively stable on prior sonograms. She also has had a prior DVT. She scheduled a follow up sonogram due to new pain on the left side. She was accustomed to having pain on the right but with pain on the left she became worried. The pain has since abated since occurring one month prior. She now has intermittent pelvic discomfort that is not different than before. She is not preventing conception and plans to start trying to conceive.     Patient's last menstrual period was 2022.    Patient is sexually active, .  Using none for contraception.    reports that she has never smoked. She has never used smokeless tobacco.    STD testing offered?  Declined    Health maintenance updated:  yes    Today's PHQ-2 Score:   PHQ-2 (  [employer]) 2022   Q1: Little interest or pleasure in doing things 0   Q2: Feeling down, depressed or hopeless 0   PHQ-2 Score 0   PHQ-2 Total Score (12-17 Years)- Positive if 3 or more points; Administer PHQ-A if positive -     Today's PHQ-9 Score: No flowsheet data found.  Today's TAE-7 Score: No flowsheet data found.    Problem list and histories reviewed & adjusted, as indicated.  Additional history: as documented.    There is no problem list on file for this patient.    Past Surgical History:   Procedure Laterality Date     NO HISTORY OF SURGERY        Social History     Tobacco Use     Smoking status: Never     Smokeless tobacco: Never   Substance Use Topics     Alcohol use: Not Currently      Problem (# of Occurrences) Relation (Name,Age of Onset)    Throat cancer (1) Paternal Grandmother            Current Outpatient Medications  "  Medication Sig     multivitamin w/minerals (THERA-VIT-M) tablet Take 1 tablet by mouth daily     No current facility-administered medications for this visit.     No Known Allergies    ROS:  12 point review of systems negative other than symptoms noted below or in the HPI.  Genitourinary: Pelvic Pain  No urinary frequency or dysuria, bladder or kidney problems      OBJECTIVE:     BP 98/56   Ht 1.549 m (5' 1\")   Wt 56.2 kg (124 lb)   LMP 11/25/2022   BMI 23.43 kg/m    Body mass index is 23.43 kg/m .    Exam:  Constitutional:  Appearance: Well nourished, well developed alert, in no acute distress  Skin: General Inspection:  No rashes present, no lesions present, no areas of discoloration.  Neurologic:  Mental Status:  Oriented X3.  Normal strength and tone, sensory exam grossly normal, mentation intact and speech normal.    Psychiatric:  Mentation appears normal and affect normal/bright.     In-Clinic Test Results:  Results for orders placed or performed in visit on 12/26/22 (from the past 24 hour(s))   US Transvaginal Pelvic Non-OB    Narrative    Table formatting from the original result was not included.  US Transvaginal Pelvic Non-OB  Order #: [id number] Accession #: [id number]  Study Notes       Spurling, Brittnee on 12/26/2022  8:17 AM      Gynecological Ultrasound Report  Pelvic U/S - Transvaginal  [facility]  Referring Provider: Amira Schmidt MD   Sonographer:  Brittnee Spurling, RDMS  Indication: Pelvic mass  LMP: No LMP recorded.  History: Endometrioma  Gynecological Ultrasonography:   Uterus: anteverted. Contour is irregular w/ myomata: 1 Midline intramural   Posterior 0.8 x 0.5 x 1.1 cm.  Size: 7.5 x 4.2 x 4.0 cm  Endometrium: Thickness Total 9.8 mm  Findings: Arcuate uterus  Right Ovary: 2.4 x 1.7 x 1.9 cm. Wnl  Left Ovary: 3.5 x 2.7 x 2.8 cm. Complex cyst with anechoic border 2.6 x   1.6 x 2.1 cm, Complex cyst 1.8 x 0.8 x 0.9 cm- endometrioma  Cul de Sac Free Fluid: No free " fluid     Impression: Anteverted uterus with newly seen uterine fibroid. Posterior   and just inferior to the endometrium. The left ovary has two complex   cysts. A stable known endometrioma and what appears to be a corpus luteum   cyst. Possible arcuate uterus.  Amira Schmidt MD                ASSESSMENT/PLAN:                                                        ICD-10-CM    1. Intramural leiomyoma of uterus  D25.1       2. Endometrioma of ovary  N80.129         There new myoma seen on her sonogram today. I discussed that her uterus is arcuate and unlikely to have a septum based on my interpretation of the ultrasound. The myoma is intramural. Given her known diagnosis of endometriosis and pelvic pain and her desire to conceive I recommended consideration of a hysteroscopy, operative laparoscopy with cystectomy, removal of endometriosis implants and tubal dye studies. She is not certain she is ready for surgery right now so I recommended considering initially trying to conceive for no more than 6 months prior to considering surgery as an alternative course of action. She will think about it and let me know her wishes.     Patient Instructions   Hysteroscopy to check the inside of the uterus    Laparoscopy to remove the left endometrioma and other endometriosis implants.  Also will perform tubal dye studies to check to see if the tubes are open.    None of these surgeries will actually remove the small fibroid that is in the muscle of the uterus.       mAira Schmidt MD  [facility] [place]

## 2022-12-27 ENCOUNTER — TELEPHONE (OUTPATIENT)
Dept: OBGYN | Facility: CLINIC | Age: 32
End: 2022-12-27

## 2022-12-27 NOTE — TELEPHONE ENCOUNTER
Pt called asking to speak to a nurse about some questions about new pregnancy (has specific questions of what she can/cannot do, etc). Please advise.

## 2022-12-27 NOTE — TELEPHONE ENCOUNTER
Patient found out she was pregnant yesterday.  Discussed safe acclivities, exerciser, sleep, ect in pregnancy.  Please don't hesitate to reach out with any further questions or concerns.  David Bennett RN on 12/27/2022 at 11:42 AM

## 2023-01-05 ENCOUNTER — TELEPHONE (OUTPATIENT)
Dept: OBGYN | Facility: CLINIC | Age: 33
End: 2023-01-05

## 2023-01-05 NOTE — TELEPHONE ENCOUNTER
Hx of endometriosis and ovarian cysts per pt  Newly pregnant 5w6d per LMP 11/25/22    1st new OB and US scheduled w Dr Schmidt 2/2/23 at 9w5d    Pt concerned w her hx of endometriosis and cysts she needs to be seen earlier michael w risk of ectopic pregnancy.    Reassured pt she does not need to be on bed rest or any changes in her current activity. Reviewed sx to monitor: heavy bleeding and severe pain needs to be evaluated in ER.    Will route to DR Schmidt to advise on her return - does she need an earlier US and visit with you based on her hx?    Geno Perry RN on 1/5/2023 at 4:43 PM

## 2023-01-10 ENCOUNTER — PRENATAL OFFICE VISIT (OUTPATIENT)
Dept: NURSING | Facility: CLINIC | Age: 33
End: 2023-01-10
Payer: COMMERCIAL

## 2023-01-10 DIAGNOSIS — Z13.79 GENETIC SCREENING: Primary | ICD-10-CM

## 2023-01-10 DIAGNOSIS — O36.80X0 PREGNANCY WITH INCONCLUSIVE FETAL VIABILITY: ICD-10-CM

## 2023-01-10 DIAGNOSIS — O09.91 SUPERVISION OF HIGH RISK PREGNANCY IN FIRST TRIMESTER: ICD-10-CM

## 2023-01-10 PROBLEM — O09.90 SUPERVISION OF HIGH-RISK PREGNANCY: Status: ACTIVE | Noted: 2023-01-10

## 2023-01-10 PROCEDURE — 99207 PR NO CHARGE NURSE ONLY: CPT

## 2023-01-10 RX ORDER — FOLIC ACID 0.8 MG
800 TABLET ORAL DAILY
COMMUNITY
End: 2023-02-02

## 2023-01-10 NOTE — TELEPHONE ENCOUNTER
I agree that we should do an early ultrasound. If there are no openings perhaps it can be at the imaging center with a telephone visit

## 2023-01-10 NOTE — PROGRESS NOTES
SUBJECTIVE:     HPI:    This is a 32 year old female patient,  who presents for her first obstetrical visit.    ANGEL LUIS: 2023, by Last Menstrual Period.  She is 6w4d weeks.  Her cycles are regular.  Her last menstrual period was normal.   Since her LMP, she has experienced  nausea, fatigue, constipation and hunger, mild cramps).   She denies emesis, abdominal pain, headache, loss of appetite, vaginal discharge, dysuria, pelvic pain, urinary urgency, lightheadedness, urinary frequency, vaginal bleeding and hemorrhoids.    Additional History: -hx of Provoked DVT - ankle fx and OCP for endometriosis  -Early US and visit per Dr Schmidt recommendations      Have you travelled during the pregnancy?No  Have your sexual partner(s) travelled during the pregnancy?Yes, If yes, where? [place]    HISTORY:   Planned Pregnancy: Yes  Marital Status:   Occupation: [employer]  Living in Household: Spouse    Past History:  Her past medical history   Past Medical History:   Diagnosis Date     DVT (deep venous thrombosis) (H) 10/2021    Previous ankle fx (cast)/OCP     Endometriosis    .      She has a history of  First Pregnancy    Since her last LMP she denies use of alcohol, tobacco and street drugs.    Past medical, surgical, social and family history were reviewed and updated in [employer].    Current Outpatient Medications   Medication     folic acid 800 MCG tablet     Prenatal Vit-Fe Sulfate-FA-DHA (PRENATAL VITAMIN/MIN +DHA PO)     No current facility-administered medications for this visit.       ROS:   12 point review of systems negative other than symptoms noted below or in the HPI.  Constitutional: Fatigue  Gastrointestinal: Nausea  Genitourinary: Cramps    Nurse phone visit completed. Prenatal book and folder (containing standard educational hand-outs and brochures) will be given at next visit to patient. Information in folder reviewed over the phone. Questions answered. Brochure given on optional  screening available to assess chromosomal anomalies. Pt desires NIPS. Pt advised to call the clinic if she has any questions or concerns related to her pregnancy. Prenatal labs future ordered. New prenatal visit scheduled on 1/12 for viability US and f/u w Dr Santana then her new OB visit 2/2/2023 with Dr Schmidt.  Geno Perry RN on 1/10/2023 at 2:33 PM    Care Every[facility] 10/20/2021: PAP NIL and HPV negative      Patient supplied answers from flow sheet for:  Prenatal OB Questionnaire.  Past Medical History  Have you ever recieved care for your mental health? : No  Have you ever been in a major accident or suffered serious trauma?: No  Within the last year, has anyone hit, slapped, kicked or otherwise hurt you?: No  In the last year, has anyone forced you to have sex when you didn't want to?: No    Past Medical History 2   Have you ever received a blood transfusion?: No  Would you accept a blood transfusion if was medically recommended?: (!) No  Does anyone in your home smoke?: No   Is your blood type Rh negative?: Unknown  Have you ever ?: No  Have you been hospitalized for a nonsurgical reason excluding normal delivery?: No (ER DVT)  Have you ever had an abnormal pap smear?: No    Past Medical History (Continued)  Do you have a history of abnormalities of the uterus?: No  Did your mother take CHUCKY or any other hormones when she was pregnant with you?: No  Do you have any other problems we have not asked about which you feel may be important to this pregnancy?: No

## 2023-01-11 NOTE — PROGRESS NOTES
SUBJECTIVE:                                                   Marie Ball is a 32 year old female who presents to clinic today for the following health issue(s):  Patient presents with:  Ultrasound: Viability      Additional information:    HPI:  Patient present for ultrasound w/ viable hall IUP with growth at 6w 6d, consistent with menstrual dating.   PMHx of provoked DVT and endometriosis. She has first OB with Dr. Schmidt scheduled on 2023    -DVT occurred after ankle fracture while she was on OCPs.  She has had Factor 2 & V checked, both negative. Additionally Cardiolipin antibodies and antithrombin III test was negative.  I do not see in her chart that protein C or S testing has been done.     C/o mild lower abdominal pain.  She denies bleeding.  Has multiple questions regarding pregnancy    Patient's last menstrual period was 2022..     Patient is sexually active, .  Using currently pregnant for contraception.    reports that she has never smoked. She has never used smokeless tobacco.    Today's PHQ-2 Score:   PHQ-2 (  [employer]) 2022   Q1: Little interest or pleasure in doing things 0   Q2: Feeling down, depressed or hopeless 0   PHQ-2 Score 0   PHQ-2 Total Score (12-17 Years)- Positive if 3 or more points; Administer PHQ-A if positive -     Today's PHQ-9 Score:   PHQ-9 SCORE 2023   PHQ-9 Total Score MyChart 3 (Minimal depression)   PHQ-9 Total Score 3     Today's TAE-7 Score:   TAE-7 SCORE 2023   Total Score 3 (minimal anxiety)   Total Score 3       Problem list and histories reviewed & adjusted, as indicated.  Additional history: as documented.    Patient Active Problem List   Diagnosis     Supervision of high-risk pregnancy     Past Surgical History:   Procedure Laterality Date     NO HISTORY OF SURGERY        Social History     Tobacco Use     Smoking status: Never     Smokeless tobacco: Never   Substance Use Topics     Alcohol use: Not Currently      Problem  (# of Occurrences) Relation (Name,Age of Onset)    Hypertension (2) Mother, Maternal Grandmother    Throat cancer (2) Paternal Grandmother, Paternal Aunt            Current Outpatient Medications   Medication Sig     folic acid 800 MCG tablet Take 800 mcg by mouth daily     Prenatal Vit-Fe Sulfate-FA-DHA (PRENATAL VITAMIN/MIN +DHA PO)      No current facility-administered medications for this visit.     No Known Allergies    ROS:  12 point review of systems negative other than symptoms noted below or in the HPI.  No urinary frequency or dysuria, bladder or kidney problems      OBJECTIVE:     BP 98/54   Wt 56.2 kg (124 lb)   LMP 11/25/2022   BMI 23.43 kg/m    Body mass index is 23.43 kg/m .    Exam:  Constitutional:  Appearance: Well nourished, well developed alert, in no acute distress  Chest:  Respiratory Effort:  Breathing unlabored. Clear to auscultation bilaterally.   Neurologic:  Mental Status:  Oriented X3.  Normal strength and tone, sensory exam grossly normal, mentation intact and speech normal.    Psychiatric:  Mentation appears normal and affect normal/bright.     In-Clinic Test Results:  Results for orders placed or performed in visit on 01/12/23 (from the past 24 hour(s))   *UA reflex to Microscopic   Result Value Ref Range    Color Urine Yellow Colorless, Straw, Light Yellow, Yellow    Appearance Urine Clear Clear    Glucose Urine Negative Negative mg/dL    Bilirubin Urine Negative Negative    Ketones Urine Negative Negative mg/dL    Specific Gravity Urine 1.015 1.003 - 1.035    Blood Urine Trace (A) Negative    pH Urine 8.5 (H) 5.0 - 7.0    Protein Albumin Urine Negative Negative mg/dL    Urobilinogen Urine 0.2 0.2, 1.0 E.U./dL    Nitrite Urine Negative Negative    Leukocyte Esterase Urine Negative Negative   Urine Microscopic Exam   Result Value Ref Range    Bacteria Urine Few (A) None Seen /HPF    RBC Urine 0-2 0-2 /HPF /HPF    WBC Urine 0-5 0-5 /HPF /HPF    Squamous Epithelials Urine Few (A) None  Seen /LPF       ASSESSMENT/PLAN:                                                        ICD-10-CM    1. Early stage of pregnancy  Z34.90       2. Supervision of high risk pregnancy in first trimester  O09.91 Urine Culture Aerobic Bacterial     *UA reflex to Microscopic     CBC with platelets     Urine Microscopic Exam     [facility] Referral - Pregnancy     CBC with platelets      3. Personal history of DVT (deep vein thrombosis)  Z86.718 [facility] Referral - Pregnancy        Ultrasound shows viable hall IUP with growth at 6w 6d, consistent with menstrual dating. Results discussed with patient.    Patient c/o mild abdominal pain, reassured and discussed reportable signs and symptoms. Provided counseling for pregnancy with known hx of endometriosis. Endometrioma noted on 12/26, not seen on US today    Hx of DVT, CBC ordered, a preliminary results showed platelets of 78. Holding Lovenox until final lab results available.  RTC on 2/2/23 for follow-up with Dr. Schmidt or sooner if needed.  Unfortunately it was not noted until after patient left that she had not had full thrombophilia workup done.  Consider completion with next visit.    [facility] referral for high risk pregnancy.    (40 minutes was spent on the date of the encounter doing chart review, review of outside records, review and interpretation of pertinent test results, history and exam, documentation, patient counseling, and further activities as noted above.)     Gris Santana MD  [facility] [place]

## 2023-01-12 ENCOUNTER — ANCILLARY PROCEDURE (OUTPATIENT)
Dept: ULTRASOUND IMAGING | Facility: CLINIC | Age: 33
End: 2023-01-12
Payer: COMMERCIAL

## 2023-01-12 ENCOUNTER — OFFICE VISIT (OUTPATIENT)
Dept: OBGYN | Facility: CLINIC | Age: 33
End: 2023-01-12
Payer: COMMERCIAL

## 2023-01-12 VITALS — WEIGHT: 124 LBS | BODY MASS INDEX: 23.43 KG/M2 | SYSTOLIC BLOOD PRESSURE: 98 MMHG | DIASTOLIC BLOOD PRESSURE: 54 MMHG

## 2023-01-12 DIAGNOSIS — O99.119 THROMBOCYTOPENIA AFFECTING PREGNANCY, ANTEPARTUM (H): ICD-10-CM

## 2023-01-12 DIAGNOSIS — Z86.718 HISTORY OF DEEP VENOUS THROMBOSIS: Primary | ICD-10-CM

## 2023-01-12 DIAGNOSIS — D69.6 THROMBOCYTOPENIA AFFECTING PREGNANCY, ANTEPARTUM (H): ICD-10-CM

## 2023-01-12 DIAGNOSIS — O36.80X0 PREGNANCY WITH INCONCLUSIVE FETAL VIABILITY: ICD-10-CM

## 2023-01-12 DIAGNOSIS — O09.91 SUPERVISION OF HIGH RISK PREGNANCY IN FIRST TRIMESTER: ICD-10-CM

## 2023-01-12 DIAGNOSIS — Z86.718 PERSONAL HISTORY OF DVT (DEEP VEIN THROMBOSIS): ICD-10-CM

## 2023-01-12 DIAGNOSIS — Z34.90 EARLY STAGE OF PREGNANCY: Primary | ICD-10-CM

## 2023-01-12 LAB
ALBUMIN UR-MCNC: NEGATIVE MG/DL
APPEARANCE UR: CLEAR
BACTERIA #/AREA URNS HPF: ABNORMAL /HPF
BILIRUB UR QL STRIP: NEGATIVE
COLOR UR AUTO: YELLOW
ERYTHROCYTE [DISTWIDTH] IN BLOOD BY AUTOMATED COUNT: 12.4 % (ref 10–15)
GLUCOSE UR STRIP-MCNC: NEGATIVE MG/DL
HCT VFR BLD AUTO: 38.5 % (ref 35–47)
HGB BLD-MCNC: 12.4 G/DL (ref 11.7–15.7)
HGB UR QL STRIP: ABNORMAL
KETONES UR STRIP-MCNC: NEGATIVE MG/DL
LEUKOCYTE ESTERASE UR QL STRIP: NEGATIVE
MCH RBC QN AUTO: 30.2 PG (ref 26.5–33)
MCHC RBC AUTO-ENTMCNC: 32.2 G/DL (ref 31.5–36.5)
MCV RBC AUTO: 94 FL (ref 78–100)
NITRATE UR QL: NEGATIVE
PH UR STRIP: 8.5 [PH] (ref 5–7)
PLATELET # BLD AUTO: 94 10E3/UL (ref 150–450)
RBC # BLD AUTO: 4.1 10E6/UL (ref 3.8–5.2)
RBC #/AREA URNS AUTO: ABNORMAL /HPF
SP GR UR STRIP: 1.01 (ref 1–1.03)
SQUAMOUS #/AREA URNS AUTO: ABNORMAL /LPF
UROBILINOGEN UR STRIP-ACNC: 0.2 E.U./DL
WBC # BLD AUTO: 9.2 10E3/UL (ref 4–11)
WBC #/AREA URNS AUTO: ABNORMAL /HPF

## 2023-01-12 PROCEDURE — 85027 COMPLETE CBC AUTOMATED: CPT | Performed by: OBSTETRICS & GYNECOLOGY

## 2023-01-12 PROCEDURE — 81001 URINALYSIS AUTO W/SCOPE: CPT | Performed by: OBSTETRICS & GYNECOLOGY

## 2023-01-12 PROCEDURE — 36415 COLL VENOUS BLD VENIPUNCTURE: CPT | Performed by: OBSTETRICS & GYNECOLOGY

## 2023-01-12 PROCEDURE — 76817 TRANSVAGINAL US OBSTETRIC: CPT | Performed by: OBSTETRICS & GYNECOLOGY

## 2023-01-12 PROCEDURE — 87086 URINE CULTURE/COLONY COUNT: CPT | Performed by: OBSTETRICS & GYNECOLOGY

## 2023-01-12 PROCEDURE — 99215 OFFICE O/P EST HI 40 MIN: CPT | Mod: 25 | Performed by: OBSTETRICS & GYNECOLOGY

## 2023-01-12 NOTE — NURSING NOTE
Instructed pt on self injection of Lovenox: prepping prefilled syringe, site selection and prep, injection of medication and disposal of syringe. Discussed side effects and bruising and switching injection sites daily and when to call.  Pt verbalized understanding, in agreement with plan, and voiced no further questions.  Taken to lab for blood work.    Geno Perry RN on 1/12/2023 at 9:52 AM

## 2023-01-13 ENCOUNTER — TELEPHONE (OUTPATIENT)
Dept: OBGYN | Facility: CLINIC | Age: 33
End: 2023-01-13

## 2023-01-13 NOTE — TELEPHONE ENCOUNTER
Discussed plan moving forward.   Phone numbers for MFM and Heme given to pt.   Discussed UA results  Rachel Mathias RN on 1/13/2023 at 11:46 AM

## 2023-01-14 LAB — BACTERIA UR CULT: NORMAL

## 2023-01-19 ENCOUNTER — PRE VISIT (OUTPATIENT)
Dept: MATERNAL FETAL MEDICINE | Facility: CLINIC | Age: 33
End: 2023-01-19
Payer: COMMERCIAL

## 2023-01-23 DIAGNOSIS — O09.91 SUPERVISION OF HIGH RISK PREGNANCY IN FIRST TRIMESTER: Primary | ICD-10-CM

## 2023-01-24 DIAGNOSIS — O36.80X0 PREGNANCY WITH INCONCLUSIVE FETAL VIABILITY: Primary | ICD-10-CM

## 2023-01-26 ENCOUNTER — LAB (OUTPATIENT)
Dept: LAB | Facility: CLINIC | Age: 33
End: 2023-01-26
Attending: OBSTETRICS & GYNECOLOGY
Payer: COMMERCIAL

## 2023-01-26 ENCOUNTER — OFFICE VISIT (OUTPATIENT)
Dept: MATERNAL FETAL MEDICINE | Facility: CLINIC | Age: 33
End: 2023-01-26
Attending: OBSTETRICS & GYNECOLOGY
Payer: COMMERCIAL

## 2023-01-26 ENCOUNTER — TELEPHONE (OUTPATIENT)
Dept: OBGYN | Facility: CLINIC | Age: 33
End: 2023-01-26
Payer: COMMERCIAL

## 2023-01-26 VITALS — HEART RATE: 67 BPM | DIASTOLIC BLOOD PRESSURE: 59 MMHG | SYSTOLIC BLOOD PRESSURE: 100 MMHG

## 2023-01-26 DIAGNOSIS — O99.119: Primary | ICD-10-CM

## 2023-01-26 DIAGNOSIS — D69.6: Primary | ICD-10-CM

## 2023-01-26 DIAGNOSIS — Z86.718 HISTORY OF DEEP VENOUS THROMBOSIS: ICD-10-CM

## 2023-01-26 LAB
BASOPHILS # BLD AUTO: 0 10E3/UL (ref 0–0.2)
BASOPHILS NFR BLD AUTO: 0 %
EOSINOPHIL # BLD AUTO: 0.1 10E3/UL (ref 0–0.7)
EOSINOPHIL NFR BLD AUTO: 2 %
ERYTHROCYTE [DISTWIDTH] IN BLOOD BY AUTOMATED COUNT: 12.5 % (ref 10–15)
HCT VFR BLD AUTO: 38.7 % (ref 35–47)
HGB BLD-MCNC: 12.8 G/DL (ref 11.7–15.7)
IMM GRANULOCYTES # BLD: 0 10E3/UL
IMM GRANULOCYTES NFR BLD: 0 %
LYMPHOCYTES # BLD AUTO: 1.9 10E3/UL (ref 0.8–5.3)
LYMPHOCYTES NFR BLD AUTO: 25 %
MCH RBC QN AUTO: 30 PG (ref 26.5–33)
MCHC RBC AUTO-ENTMCNC: 33.1 G/DL (ref 31.5–36.5)
MCV RBC AUTO: 91 FL (ref 78–100)
MONOCYTES # BLD AUTO: 0.5 10E3/UL (ref 0–1.3)
MONOCYTES NFR BLD AUTO: 7 %
NEUTROPHILS # BLD AUTO: 4.9 10E3/UL (ref 1.6–8.3)
NEUTROPHILS NFR BLD AUTO: 66 %
NRBC # BLD AUTO: 0 10E3/UL
NRBC BLD AUTO-RTO: 0 /100
PLATELET # BLD AUTO: 96 10E3/UL (ref 150–450)
RBC # BLD AUTO: 4.27 10E6/UL (ref 3.8–5.2)
WBC # BLD AUTO: 7.4 10E3/UL (ref 4–11)

## 2023-01-26 PROCEDURE — 84999 UNLISTED CHEMISTRY PROCEDURE: CPT

## 2023-01-26 PROCEDURE — 86146 BETA-2 GLYCOPROTEIN ANTIBODY: CPT

## 2023-01-26 PROCEDURE — 85306 CLOT INHIBIT PROT S FREE: CPT

## 2023-01-26 PROCEDURE — 85390 FIBRINOLYSINS SCREEN I&R: CPT | Mod: 26 | Performed by: PATHOLOGY

## 2023-01-26 PROCEDURE — 85730 THROMBOPLASTIN TIME PARTIAL: CPT

## 2023-01-26 PROCEDURE — 85025 COMPLETE CBC W/AUTO DIFF WBC: CPT

## 2023-01-26 PROCEDURE — 36415 COLL VENOUS BLD VENIPUNCTURE: CPT

## 2023-01-26 PROCEDURE — G0463 HOSPITAL OUTPT CLINIC VISIT: HCPCS | Performed by: OBSTETRICS & GYNECOLOGY

## 2023-01-26 PROCEDURE — 86147 CARDIOLIPIN ANTIBODY EA IG: CPT

## 2023-01-26 PROCEDURE — 85303 CLOT INHIBIT PROT C ACTIVITY: CPT

## 2023-01-26 PROCEDURE — 99202 OFFICE O/P NEW SF 15 MIN: CPT | Performed by: OBSTETRICS & GYNECOLOGY

## 2023-01-26 NOTE — TELEPHONE ENCOUNTER
MFM called. Patient will be doing genetic counseling with MFM in first trimester as well.  Dating US completed 1/12/23  Per MFM cancel US on 2/2/23, pt to keep NOB with Roxana.  Updating scheduling.  David Bennett RN on 1/26/2023 at 10:36 AM

## 2023-01-26 NOTE — NURSING NOTE
Patient presents to [facility] for consult due to history of DVT and low platelets with Dr. Palacios. Denies LOF, vaginal bleeding, cramping/contractions. SBAR given to M MD, see their note in Epic. /59. Patient will have some labs drawn today as outlined in the consult note and will also schedule GC/NT appointment with [facility].

## 2023-01-26 NOTE — PROGRESS NOTES
Maternal-Fetal Medicine Consult  Requesting provider - Dr. Gris Santana  Performing provider - Dr. Rocco Palacios    Dear Dr. Santana,    Thank you for allowing us to see Marie today for a consultation for history of DVT, now in pregnancy, and with new finding of low platelets.      I spent 20 minutes prior to the visit preparing to see the patient (reviewing medical records and tests). I spent 20 minutes face to face with the patient during the visit with the majority (> 50% ) of the time spent on counseling and coordination of care with the patient. I spent 10 minutes after the visit with the patient documenting the visit in the electronic health record and/or communicating with other health care professionals and/or care coordination.     Total time spent on today's day of service: 50 minutes.    HPI  She is a 32 year old  at 8w6d by LMP and 6 week US. In  she underwent an ankle injury. Records note a history of surgery but Marie says there was no surgery, but some immobilization. She was on OCPs. She developed a thrombosis of the left peroneal vein in October of that same year. She was treated with 3 months of Eliquis anticoagulation but is not on it now. She was not told at that time if she would need to be on blood thinners during any future pregnancy.     OB History Primigravida    GYN History Noncontributory    Additional medical history, surgical history     There is no family history of DVT or PE.    Ob US outside record of dating US reviewed with normal 6w6d US noted.     Office Visit on 2023   Component Date Value Ref Range Status     Culture 2023 >100,000 CFU/mL Mixture of urogenital grecia   Final     Color Urine 2023 Yellow  Colorless, Straw, Light Yellow, Yellow Final     Appearance Urine 2023 Clear  Clear Final     Glucose Urine 2023 Negative  Negative mg/dL Final     Bilirubin Urine 2023 Negative  Negative Final     Ketones Urine 2023  Negative  Negative mg/dL Final     Specific Gravity Urine 2023 1.015  1.003 - 1.035 Final     Blood Urine 2023 Trace (A)  Negative Final     pH Urine 2023 8.5 (H)  5.0 - 7.0 Final     Protein Albumin Urine 2023 Negative  Negative mg/dL Final     Urobilinogen Urine 2023 0.2  0.2, 1.0 E.U./dL Final     Nitrite Urine 2023 Negative  Negative Final     Leukocyte Esterase Urine 2023 Negative  Negative Final     Bacteria Urine 2023 Few (A)  None Seen /HPF Final     RBC Urine 2023 0-2  0-2 /HPF /HPF Final     WBC Urine 2023 0-5  0-5 /HPF /HPF Final     Squamous Epithelials Urine 2023 Few (A)  None Seen /LPF Final     WBC Count 2023 9.2  4.0 - 11.0 10e3/uL Final     RBC Count 2023 4.10  3.80 - 5.20 10e6/uL Final     Hemoglobin 2023 12.4  11.7 - 15.7 g/dL Final     Hematocrit 2023 38.5  35.0 - 47.0 % Final     MCV 2023 94  78 - 100 fL Final     MCH 2023 30.2  26.5 - 33.0 pg Final     MCHC 2023 32.2  31.5 - 36.5 g/dL Final     RDW 2023 12.4  10.0 - 15.0 % Final     Platelet Count 2023 94 (L)  150 - 450 10e3/uL Final     Labs from  that were negative - factor V Leiden, prothrombin gene mutation, antithrombin 3 deficiency. Anticardiolipin IgA only was negative.   Platelets in  were low normal at 162 (non-pregnant)    Platelets were repeated today and are stable at 96.     /59 (BP Location: Right arm, Patient Position: Sitting, Cuff Size: Adult Regular)   Pulse 67   LMP 2022   Exam: deferred    Assessment  - 32 year old  at 8 weeks 6 days gestation with medical history significant for a deep vein thrombosis in 2021 after ankle injury requiring immobilization and well on oral contraceptives.  Patient has notable decrease in platelets to 94 2 weeks ago, stable at 96 today.  2 years ago she was noted to have low normal platelets.    Today we discussed the following  issues:    #1 history of deep vein thrombosis-we reviewed the different options for anticoagulation in pregnancy and after pregnancy.  We discussed that the highest risk of recurrent blood clot is during the postpartum period or if any period of immobilization is required during pregnancy.  We discussed that some patients require prenatal anticoagulation, while others can opt for risk based prenatal anticoagulation and  anticoagulation only.  She has no family history of venous thromboembolism or deep vein thrombosis which is reassuring.  However she has an incomplete acquired and inherited thrombophilia work-up, and this must be completed before we can say with certainty that anticoagulation is not required prior to delivery.  Given her history of immobilization and blood clot while taking oral contraceptives, I do recommend 6 weeks of postpartum anticoagulation with prophylactic Lovenox.  This should be 40 mg subcu daily.  Alternatively she might opt for a different newer anticoagulant, perhaps 1 that cannot be used currently in pregnancy, that might be safer with low platelets, and might be easier to administer.  I will defer this decision to her hematology consult next week.    At this time I have recommended that we complete her evaluation for thrombophilia.  This includes drawing the following labs: Lupus anticoagulant, full anticardiolipin antibodies, beta-2 glycoprotein antibodies, protein C activity, and protein S activity.  While it is true that protein C&S levels may change in pregnancy, there are adapted standards for normal during pregnancy.    I did take the liberty of having these labs drawn today, so that they would be available for review at the time of her hematology consultation.  At this time I recommended that we hold off on anticoagulation.  If her thrombophilia work-up is negative, I recommend no anticoagulation prior to delivery unless she requires extended immobilization or is  hospitalized.  I do recommend prophylactic anticoagulation for 6 weeks after delivery but will defer to hematology for the best medication to use based on her low platelets.  History of blood clots has not been associated with an increased risk of suboptimal fetal growth and so serial growth ultrasounds were not recommended at this time.    #2 low platelets -it appears that the patient had low normal platelets as far back as November 2021, and now has stable but lower platelets on serial evaluation.  Her low level of platelets is somewhat early for gestational thrombocytopenia, although this may be still a potential etiology.  Given her lab trends it is much more likely that she has mild immune thrombocytopenic purpura or ITP.  This will challenge our ability to treat her with anticoagulation, as some standard anticoagulants are also known to drop your platelets.  Therefore I will defer to her hematology consultation next week, to make recommendations for anticoagulation.  Luckily she does not appear to require anticoagulation before delivery at this time, but if her thrombophilia work-up returns positive this may need to be addressed.  Many of the newer oral anticoagulants are not well studied in pregnancy.  Likewise Coumadin has been known to cross the placenta and cause significant fetal harms.  Coumadin is not recommended in pregnancy.  The most common treatment during pregnancy is Lovenox or enoxaparin.  If this were indicated I would recommend 40 mg daily for prophylaxis.    I have also taken the liberty of drawing her platelets today at her request so that those are available at the time of her hematology consultation.  Those returned after her visit and were stable at 96.       At this time I have recommended that she follow-up with her hematology team for additional recommendations requiring anticoagulation and treatment of her low platelets.  We did review the risks of complications with thrombocytopenia  during pregnancy, including the inability to have regional anesthesia in labor at certain low levels of platelets, and an increased risk of bleeding..  I have also recommended that she consider her follow-up ultrasound to be performed with M.  The patient is interested in aneuploidy screening and so she has been scheduled for a first trimester nuchal translucency and genetic consult.  I also recommend that she have her anatomic survey with us.    Thank you for allowing us to share in this patient's care today.  Follow-up has been scheduled.  We look forward to hearing from her hematology team as well.      Rocco Palacios MD    Maternal Fetal Medicine

## 2023-01-27 LAB
B2 GLYCOPROT1 IGG SERPL IA-ACNC: 1.5 U/ML
B2 GLYCOPROT1 IGM SERPL IA-ACNC: <2.4 U/ML
CARDIOLIPIN IGG SER IA-ACNC: <2 GPL-U/ML
CARDIOLIPIN IGG SER IA-ACNC: NEGATIVE
CARDIOLIPIN IGM SER IA-ACNC: 2.4 MPL-U/ML
CARDIOLIPIN IGM SER IA-ACNC: NEGATIVE
DRVVT SCREEN RATIO: 0.79
INR PPP: 1.01 (ref 0.85–1.15)
LA PPP-IMP: NEGATIVE
LUPUS INTERPRETATION: NORMAL
PROT C ACT/NOR PPP CHRO: 103 % (ref 70–170)
PTT RATIO: 0.97
THROMBIN TIME: 16.8 SECONDS (ref 13–19)

## 2023-02-01 LAB
ABO/RH(D): NORMAL
ANTIBODY SCREEN: NEGATIVE
SCANNED LAB RESULT: ABNORMAL
SPECIMEN EXPIRATION DATE: NORMAL

## 2023-02-02 ENCOUNTER — OFFICE VISIT (OUTPATIENT)
Dept: HEMATOLOGY | Facility: CLINIC | Age: 33
End: 2023-02-02
Attending: PHYSICIAN ASSISTANT
Payer: COMMERCIAL

## 2023-02-02 ENCOUNTER — PRENATAL OFFICE VISIT (OUTPATIENT)
Dept: OBGYN | Facility: CLINIC | Age: 33
End: 2023-02-02
Payer: COMMERCIAL

## 2023-02-02 VITALS
WEIGHT: 125.7 LBS | BODY MASS INDEX: 23.75 KG/M2 | OXYGEN SATURATION: 99 % | TEMPERATURE: 97.8 F | DIASTOLIC BLOOD PRESSURE: 67 MMHG | SYSTOLIC BLOOD PRESSURE: 103 MMHG | HEART RATE: 71 BPM

## 2023-02-02 VITALS — BODY MASS INDEX: 23.81 KG/M2 | DIASTOLIC BLOOD PRESSURE: 60 MMHG | WEIGHT: 126 LBS | SYSTOLIC BLOOD PRESSURE: 92 MMHG

## 2023-02-02 DIAGNOSIS — Z86.718 PERSONAL HISTORY OF DVT (DEEP VEIN THROMBOSIS): ICD-10-CM

## 2023-02-02 DIAGNOSIS — O99.119 THROMBOCYTOPENIA AFFECTING PREGNANCY, ANTEPARTUM (H): Primary | ICD-10-CM

## 2023-02-02 DIAGNOSIS — D69.6 THROMBOCYTOPENIA AFFECTING PREGNANCY, ANTEPARTUM (H): Primary | ICD-10-CM

## 2023-02-02 DIAGNOSIS — O09.91 SUPERVISION OF HIGH RISK PREGNANCY IN FIRST TRIMESTER: Primary | ICD-10-CM

## 2023-02-02 DIAGNOSIS — Z33.1 PREGNANCY, INCIDENTAL: ICD-10-CM

## 2023-02-02 LAB
ERYTHROCYTE [DISTWIDTH] IN BLOOD BY AUTOMATED COUNT: 12.7 % (ref 10–15)
HCT VFR BLD AUTO: 39.4 % (ref 35–47)
HGB BLD-MCNC: 12.7 G/DL (ref 11.7–15.7)
MCH RBC QN AUTO: 30.4 PG (ref 26.5–33)
MCHC RBC AUTO-ENTMCNC: 32.2 G/DL (ref 31.5–36.5)
MCV RBC AUTO: 94 FL (ref 78–100)
PLATELET # BLD AUTO: 96 10E3/UL (ref 150–450)
RBC # BLD AUTO: 4.18 10E6/UL (ref 3.8–5.2)
WBC # BLD AUTO: 9.6 10E3/UL (ref 4–11)

## 2023-02-02 PROCEDURE — 86900 BLOOD TYPING SEROLOGIC ABO: CPT | Performed by: OBSTETRICS & GYNECOLOGY

## 2023-02-02 PROCEDURE — 86850 RBC ANTIBODY SCREEN: CPT | Performed by: OBSTETRICS & GYNECOLOGY

## 2023-02-02 PROCEDURE — 86901 BLOOD TYPING SEROLOGIC RH(D): CPT | Performed by: OBSTETRICS & GYNECOLOGY

## 2023-02-02 PROCEDURE — G0463 HOSPITAL OUTPT CLINIC VISIT: HCPCS | Performed by: PHYSICIAN ASSISTANT

## 2023-02-02 PROCEDURE — 86803 HEPATITIS C AB TEST: CPT | Performed by: OBSTETRICS & GYNECOLOGY

## 2023-02-02 PROCEDURE — 85027 COMPLETE CBC AUTOMATED: CPT | Performed by: OBSTETRICS & GYNECOLOGY

## 2023-02-02 PROCEDURE — 86762 RUBELLA ANTIBODY: CPT | Performed by: OBSTETRICS & GYNECOLOGY

## 2023-02-02 PROCEDURE — 86780 TREPONEMA PALLIDUM: CPT | Performed by: OBSTETRICS & GYNECOLOGY

## 2023-02-02 PROCEDURE — 99207 PR FIRST OB VISIT: CPT | Performed by: OBSTETRICS & GYNECOLOGY

## 2023-02-02 PROCEDURE — 87389 HIV-1 AG W/HIV-1&-2 AB AG IA: CPT | Performed by: OBSTETRICS & GYNECOLOGY

## 2023-02-02 PROCEDURE — 87340 HEPATITIS B SURFACE AG IA: CPT | Performed by: OBSTETRICS & GYNECOLOGY

## 2023-02-02 PROCEDURE — 36415 COLL VENOUS BLD VENIPUNCTURE: CPT | Performed by: OBSTETRICS & GYNECOLOGY

## 2023-02-02 PROCEDURE — 99205 OFFICE O/P NEW HI 60 MIN: CPT | Performed by: PHYSICIAN ASSISTANT

## 2023-02-02 NOTE — PATIENT INSTRUCTIONS
[facility]  [facility]  [facility] [place], [address], [place] [address]  Main: [phone], Fax: [phone]    Marie,   It was a pleasure seeing you today.  Thank you for allowing us to be involved in your care.  Please let us know if there is anything else we can do for you, so that we can be sure you are leaving completely satisfied with your care experience. Below is the plan that we discussed.     I have placed a standing lab order to have your platelets checked every 2 weeks. If the platelet count is dropping, we may recommend changing it to weekly.   For now, we will hold off on starting a blood thinning medication until we know where your platelet count levels off at.   I will have you start wearing compression stockings daily (OK to take off at night).   Throughout the day, make sure to stand up at least once every 1-2 hours and walk around. Try toe taps, heel raises and leg kicks to get the blood moving. Stay hydrated.   If you will be participating in long distance travel >4 hours, please contact us prior to the trip so we can finalize plans regarding a blood thinner.   I do recommend blood thinner (Lovenox) for 6 weeks after delivery.   If you notice pain, swelling, redness of your leg then please call the clinic to get an ultrasound order. If it is after hours, I would recommend going to an urgent care. If you have any shortness of breath or chest pain, please go straight to the ER.     We would like a provider on our team to see you at least annually for optimal care and to allow us to continue to prescribe for you.      Return to clinic in  1 month, virtual visit OK.    Your nurse clinician is Gail Ayoub -[phone].   If they are unavailable and you have immediate concerns, please call [phone] and ask for a nurse.       Saima Martinez, MPH, PA-C  [facility]

## 2023-02-02 NOTE — PROGRESS NOTES
SUBJECTIVE:     HPI:    This is a 32 year old female patient,  who presents for her first obstetrical visit.    ANGEL LUIS: 2023, by Last Menstrual Period.  She is 9w6d weeks.  Her cycles are regular.  Her last menstrual period was normal.   Since her LMP, she mostly nausea and fatigue without emesis).   She denies emesis and vaginal bleeding.    Additional History: Marie is here with [place] for a new prenatal visit. Her history is significant for a provoked DVT due to an ankle fracture while on OCPs. The plan was to start anticoagulation in pregnancy and for the 6 weeks afterwards. She was discovered to have thrombocytopenia at her viability visit with Dr. Santana. The low platelets seem to have been present since . She completed her thrombophilic work up with [facility] and so far has no clear inherited or acquired thrombophilia. She has a visit with Hematology today for final recommendations on anticoagulation in pregnancy. They are desiring of aneuploidy screening. Options were reviewed and they also have an upcoming visit with a GC. Will defer NIPT until after her visit. A Nuchal translucency is upcoming as well.  Marie is struggling with the new diagnosis of pregnancy as she feels a loss of freedom.      HISTORY:   Planned Pregnancy: Yes  Marital Status:   Occupation: [employer]  Living in Household:     Past History:  Her past medical history   Past Medical History:   Diagnosis Date     DVT (deep venous thrombosis) (H) 10/2021    Previous ankle fx (cast)/OCP     Endometriosis    .      She has a history of  first pregnancy    Since her last LMP she denies use of alcohol, tobacco and street drugs.    Past medical, surgical, social and family history were reviewed and updated in [facility].        Current Outpatient Medications   Medication     Prenatal Vit-Fe Sulfate-FA-DHA (PRENATAL VITAMIN/MIN +DHA PO)     No current facility-administered medications for this visit.       ROS:   12  point review of systems negative other than symptoms noted below or in the HPI.      OBJECTIVE:     EXAM:  BP 92/60   Wt 57.2 kg (126 lb)   LMP 2022   BMI 23.81 kg/m   Body mass index is 23.81 kg/m .    GENERAL: healthy, alert and no distress  EYES: Eyes grossly normal to inspection, PERRL and conjunctivae and sclerae normal  MS: no gross musculoskeletal defects noted, no edema  SKIN: no suspicious lesions or rashes  NEURO: Normal strength and tone, mentation intact and speech normal  PSYCH: mentation appears normal, affect normal/bright    ASSESSMENT/PLAN:       ICD-10-CM    1. Supervision of high risk pregnancy in first trimester  O09.91 ABO/Rh type and screen     Hepatitis B surface antigen     CBC with platelets     HIV Antigen Antibody Combo     Rubella Antibody IgG Quantitative     Treponema Abs w Reflex to RPR and Titer     Hepatitis C antibody          32 year old , 9w6d weeks of pregnancy with ANGEL LUIS of 2023, by Last Menstrual Period    Discussed as follows:Aneuploidy screening. Leaning towards NIPT. Can have performed with MFM or here. Discussed need for AFP in the second trimester to screen for open neural tube defects. At this time will defer anticoagulation while Hematology recs are forthcoming. Will send prenatal labs today.  Counseling given:   - Follow up in 4 weeks for return OB visit.  - Recommended weight gain for pregnancy: 25-35 lbs.         Amira Schmidt MD

## 2023-02-02 NOTE — PROGRESS NOTES
[facility]  [address] [place] [id number]  Phone: [phone], Fax: [phone]    Outpatient Visit Note:    Patient: Marie Ball  MRN: [id number]  : 1990  LAURA: 2023    Reason for Consultation:  Marie Ball is a referred for evaluation and treatment of thrombocytopenia in pregnancy and history of provoked venous thromboembolism while on estrogen.     Assessment:  In summary, Marie Ball is a 32 year old  female with past medical history significant for endometriosis and thrombocytopenia (140-160K) who had a provoked DVT in setting of immobilization, prolonged travel after avulsion fracture while on recently started estrogen containing oral contraceptives. She is now 9w6d pregnant and her platelets have been within 94-96K. She presents today for evaluation of thrombocytopenia in pregnancy and for consideration of prophylactic anticoagulation with history of provoked DVT while on estrogen.     Given history of mild thrombocytopenia prior to pregnancy (140K) and platelet count <100K in first trimester, thrombocytopenia is most likely consistent with immune thrombocytopenia rather than gestational thrombocytopenia. Fortunately, she has not had significant bleeding history. She has tolerated Eliquis in the past without exacerbation in easy bruising/bleeding symptoms.     Her prior venous thromboembolism was likely in part related to new start of OCPs several months prior but she also suffered avulsion fracture requiring immobilization and also participated in long distance travel while likely compounded her risk for development of venous thromboembolism.     Plan:  1. Majority of today's visit was spent counseling the patient regarding gestational thrombocytopenia vs immune thrombocytopenia, pathophysiology, evaluation and management especially during pregnancy. Also discussed risk of venous thromboembolism with pregnancy given history of  venous thromboembolism in setting of OCP use, immobilization.   2. I counseled her that 10% of women develop thrombocytopenia in pregnancy. Gestational thrombocytopenia occurs in 4-12% of pregnancies and accounts for 75% of thrombocytopenia in pregnancy. This presents with a gradual downtrend beginning in second trimester due to increased plasma volume and increased platelet clearance. Immune thrombocytopenia (ITP) occurs in 1 in 1,000-10,000 pregnancies and accounts for only 3% of thrombocytopenia in pregnancy but it is the most common cause of platelet count below 50K in first and second trimesters. About 15-35% of pregnant women require treatment in pregnancy or around labor and delivery. Unfortunately there is no lab test to distinguish between ITP and GT, however her clinical history is most consistent with immune thrombocytopenia based on history of mild low platelet count prior to pregnancy, normal platelet morphology on peripheral smear and history of mild easy bruising/gingival bleeding. Furthermore she is not on any offending medications and has no family history of thrombocytopenia.    3. I counseled her that there are several thrombocytopenia cut offs that are important to determine need for treatment therefore close monitoring of platelet count is important. I have recommended platelet count every 2 weeks until we better understand where her platelet trends are. Closer to delivery (`~ 32 weeks), weekly platelet counts are recommended as labor and delivery planning is occurring.   - It is reasonable for her to be on anticoagulation including prophylactic dose of Lovenox as long as platelet count is >50K.   - For neuroaxial anesthesia, platelet count of 75K is required to avoid epidural hematoma formation. Some anesthesiologists prefer 100K goal.   - If platelet count is <75K, vacuum assisted or forceps assisted delivery is advised against.   - There is an increased risk of bleeding if platelet count is  <20-30K for vaginal delivery or <50K for .   - Treatment is indicated when platelet count drops below 20-30K for procedures and delivery as there is a risk of spontaneous bleeding this platelet level.   - Treatment to increase platelet counts for ITP include steroid administration or IVIG. If there is no response to steroids, then platelet transfusions are considered. Antifibrinolytics are also used as adjunctive therapy perioperatively.   4. Prophylactic anticoagulation with Lovenox may be used at 40mg once daily subcutaneous until platelet count falls below 50K. Discussed options including    - Initiation of anticoagulation with Lovenox during pregnancy/postpartum with close monitoring and holding of Lovenox if platelet count drops <50K.   - Initiation of anticoagulation with Lovenox postpartum x 6 weeks with close monitoring and holding of Lovenox if platelet count drops below 50K and encouraging hydration, exercise, avoidance of immobilization, and use of compression garments with close monitoring of signs/symptoms of venous thromboembolism and low threshold for US imaging if any symptoms occur. I did discuss concern that if she did develop venous thromboembolism in pregnancy she would require therapeutic anticoagulation which is a higher dose than the prophylactic dose thus increased risk of bleeding.   For now she has opted for the latter. Will closely monitor platelet counts over the next month and see if they level off. Will discuss again at next appointment in 1 month.     The patient is given our center's contact information and is instructed to call if she should have any further questions or concerns.  Otherwise, we will plan on seeing her back in 1 month.      Patient understands and agrees with the above plan and recommendation.      Saima Martinez, MPH, PA-C  [facility]    60 minutes spent on the date of the encounter doing chart  review, review of outside records, review of test results, interpretation of tests, patient visit, documentation and coordination of care.     ----------------------------------------------------------------------------------------------------------------------  History of Present Illness:  Marie Ball is a 32 year old female with past medical history significant for endometriosis and provoked DVT. She is currently pregnant and her OBGYN wanted to place her on Lovenox for venous thromboembolism prophylaxis in pregnancy however she was found to have low platelet count. She was referred for further evaluation and management.     Marie had a small minimally displaced avulsion fracture of lateral malleolus in 9/2021 after rolling her ankle. She was placed in a CAM boot, then stirrup ankle splint then transitioned to ace wrap. She had air travel to [place], [place] from 9/22-9/26 and did 4-5 hours of driving while there on one of the days. Of note, she had been started on estrogen containing OCPs several three months prior. She noticed increased swelling and returned to urgent care for evaluation on 10/14/2021. US showed peroneal vein DVT of the left lower extremity. She was treated for provoked venous thromboembolism with Eliquis for three months which she tolerated well without increased bruising or bleeding symptoms. Follow up ultrasound after three months of anticoagulation showed resolution of DVT.     She has no other history of venous thromboembolism and no family history of venous thromboembolism.     Thrombophilia evaluation was completed negative for factor V leiden, prothrombin gene mutation, protein C, antithrombin deficiencies and antiphospholipid antibodies. Her protein S level did return at 46 which is below normal cut off range of 75. It is expected to be reduced during pregnancy.    She is currently 9w6d pregnant with her first pregnancy. She was noted to have thrombocytopenia with platelet count  between 94-96K. She has had few CBC with platelets in the past but did have platelet count of 147K in 3/2020 and 162K in 10/2021. It appears that she did have a peripheral smear in 2020 for evaluation of thrombocytopenia which showed platelets normal in number and morphology.     She reports no history of significant easy bruising or bleeding. Her menstrual cycles are 5-6 days long with heaviest flow on the second day. She changes pads only about 2-3 times per day. No overnight awakening to change products. She notes while on eliquis her flow may have been just slightly heavier but not significantly worse. She notes no history of epistaxis, hematuria, hematochezia. She does report occasional gingival bleeding when brushing her teeth. No history of hematoma formation or hemarthrosis. She has not had any true surgical challenges in the past. She has no history of liver, kidney, thyroid dysfunction. No increased alcohol intake or herbal/supplement/antiplatelet use. No family history of easy bruising/bleeding/surgical bleeding/PPH. She did have COVID in August 2022. She is HIV and HEP C negative.    She established care with OBGYN/MFM for evaluation of anticoagulation in pregnancy given history of provoked DVT while on estrogen. Given thrombocytopenia, Dr. Palacios recommended initiation of Lovenox for secondary venous thromboembolism prophylaxis for 6 weeks postpartum.     She is considering travel via air/cruise in second trimester but understands clinical course may effect this.     No history of hypertension. No abdominal pain other than cramping during menses related to endometriosis. No recent fevers or infections other than COVID in August. No prior use of steroids. No history of other autoimmune conditions.     Past Medical History:  Past Medical History:   Diagnosis Date     DVT (deep venous thrombosis) (H) 10/2021    Previous ankle fx (cast)/OCP     Endometriosis        Past Surgical History:  Past Surgical  History:   Procedure Laterality Date     NO HISTORY OF SURGERY     Has tonsils and wisdom teeth     Medications:  Current Outpatient Medications   Medication Sig Dispense Refill     folic acid 800 MCG tablet Take 800 mcg by mouth daily       Prenatal Vit-Fe Sulfate-FA-DHA (PRENATAL VITAMIN/MIN +DHA PO)           Allergies:  No Known Allergies    ROS:  Remainder of a comprehensive 14 point ROS is negative unless noted above.    Social History:  Working from home. Has a sit to stand desk.   Denies any tobacco use. No significant alcohol use. Denies any illicit drug use.     Family History:  Denies any family history of bleeding or clotting disorders.   No family history of post partum hemorrhage or miscarriage.     Objectives:  Vitals: LMP 11/25/2022    Exam:   Constitutional: Appears well, no distress  HEENT: Pupils equal and round. No scleral icterus or hemorrhage.   Respiratory: no increased work of breathing.   Mus/Skele: no edema of lower extremities  Skin: no petechiae, no ecchymosis on exposed dermis.  Neuro: CN II-XII intact. Normal gait. AOx3      Labs:  CBC RESULTS:   Recent Labs   Lab Test 01/26/23  1047   WBC 7.4   RBC 4.27   HGB 12.8   HCT 38.7   MCV 91   MCH 30.0   MCHC 33.1   RDW 12.5   PLT 96*     Last Comprehensive Metabolic Panel:  No results found for: NA, POTASSIUM, CHLORIDE, CO2, ANIONGAP, GLC, BUN, CR, GFRESTIMATED, CATHERINE  Liver Function Studies - No results for input(s): PROTTOTAL, ALBUMIN, BILITOTAL, ALKPHOS, AST, ALT, BILIDIRECT in the last 89434 hours.      Imaging:  Recent Results (from the past 744 hour(s))   US OB Transvaginal Only ([id number])    Narrative    Table formatting from the original result was not included.  US OB Transvaginal Only ([id number])  Order #: [id number] Accession #: [id number]  Study Notes       Yue Steel-ANGELA on 1/12/2023  8:53 AM      Obstetrical Ultrasound Report  OB U/S  < 14 Weeks -  Transvaginal  [facility]  Referring Provider: Amira Schmidt  MD  Sonographer: Nain Steel RDMS  Indication:  Viability check      Dating (mm/dd/yyyy):   LMP: 11/25/2022            EDC:  09/01/2023            GA by LMP:           6w6d     Current Scan On:  1/12/2023          EDC:  09/01/2023            GA by Current Scan:          6w6d  The calculation of the gestational age by current scan was based on CRL.  Anatomy Scan:  Hoffman gestation.  Biometry:  CRL                       0.89 cm                6w6d                      Yolk Sac                              0.2 cm                                                       Fetal heart activity:  Rate and rhythm is within normal limits.  Fetal   heart rate: 134 bpm  Findings: Single viable IUP     Maternal Structures:  Cervix: The cervix appears long and closed.  Right Ovary: Wnl  Left Ovary: Complex cyst 1.6 x 1.3 x 1.3 cm  Impression:        Hoffman IUP with growth at 6w 6d (+/-  5 days) which is consistent with   menstrual dating, EDC 9/1/2023.    Gris Santana MD       Peripheral smear: 2020  FINAL DIAGNOSIS Peripheral blood, morphology:    Normochromic normocytic red cells with no significant morphologic change.    White blood cells normal in number and morphology.    No evidence of myelodysplasia or malignancy.    Platelets normal in number and morphology.

## 2023-02-03 LAB
HBV SURFACE AG SERPL QL IA: NONREACTIVE
HCV AB SERPL QL IA: NONREACTIVE
HIV 1+2 AB+HIV1 P24 AG SERPL QL IA: NONREACTIVE
RUBV IGG SERPL QL IA: 11 INDEX
RUBV IGG SERPL QL IA: POSITIVE
T PALLIDUM AB SER QL: NONREACTIVE

## 2023-02-08 RX ORDER — ENOXAPARIN SODIUM 100 MG/ML
40 INJECTION SUBCUTANEOUS DAILY
Qty: 12 ML | Refills: 11 | Status: ON HOLD | OUTPATIENT
Start: 2023-02-08 | End: 2023-08-23

## 2023-02-08 NOTE — RESULT ENCOUNTER NOTE
Socrates Salazar  Since you platelet level is stable at 96 I recommend we start Lovenox as recommended by Hematology. I will send this prescription to your pharmacy. The plan will be to continue this and to closely monitor your platelet level. As long as it stays above 50K we will plan to continue it until 6 weeks postpartum. If it drops below 75K we will start oral steroids firstly.  Se you soon  Best  Dr Schmidt

## 2023-02-14 ENCOUNTER — HOSPITAL ENCOUNTER (OUTPATIENT)
Dept: ULTRASOUND IMAGING | Facility: CLINIC | Age: 33
Discharge: HOME | End: 2023-02-14
Attending: OBSTETRICS & GYNECOLOGY
Payer: COMMERCIAL

## 2023-02-14 ENCOUNTER — LAB (OUTPATIENT)
Dept: LAB | Facility: CLINIC | Age: 33
End: 2023-02-14
Attending: OBSTETRICS & GYNECOLOGY
Payer: COMMERCIAL

## 2023-02-14 ENCOUNTER — OFFICE VISIT (OUTPATIENT)
Dept: MATERNAL FETAL MEDICINE | Facility: CLINIC | Age: 33
End: 2023-02-14
Attending: OBSTETRICS & GYNECOLOGY
Payer: COMMERCIAL

## 2023-02-14 DIAGNOSIS — Z36.9 VISIT FOR PRENATAL SCREENING: Primary | ICD-10-CM

## 2023-02-14 DIAGNOSIS — Z36.9 VISIT FOR PRENATAL SCREENING: ICD-10-CM

## 2023-02-14 DIAGNOSIS — Z86.718 HISTORY OF DEEP VENOUS THROMBOSIS: ICD-10-CM

## 2023-02-14 DIAGNOSIS — Z34.01 PRIMIGRAVIDA IN FIRST TRIMESTER: ICD-10-CM

## 2023-02-14 DIAGNOSIS — O99.119: Primary | ICD-10-CM

## 2023-02-14 DIAGNOSIS — D69.6: Primary | ICD-10-CM

## 2023-02-14 PROCEDURE — 36415 COLL VENOUS BLD VENIPUNCTURE: CPT

## 2023-02-14 PROCEDURE — 76813 OB US NUCHAL MEAS 1 GEST: CPT | Mod: 26 | Performed by: OBSTETRICS & GYNECOLOGY

## 2023-02-14 PROCEDURE — 76813 OB US NUCHAL MEAS 1 GEST: CPT

## 2023-02-14 PROCEDURE — 96040 HC GENETIC COUNSELING, EACH 30 MINUTES: CPT | Performed by: GENETIC COUNSELOR, MS

## 2023-02-14 NOTE — PROGRESS NOTES
The patient was seen for an ultrasound in the [facility] Center at the [facility] today.  For a detailed report of the ultrasound examination, please see the ultrasound report which can be found under the imaging tab.    Shruthi Purvis MD  , OB/GYN  Maternal-Fetal Medicine  [phone] (Pager)

## 2023-02-14 NOTE — NURSING NOTE
Patient presents to M for GC/NT. Denies LOF, vaginal bleeding, cramping/contractions. SBAR given to MONTANA MD, see their note in Epic.

## 2023-02-14 NOTE — PROGRESS NOTES
[facility] [place]  Genetic Counseling Consult    Patient: Marie Ball YOB: 1990   Date of Service: 23      Marie Ball was seen at [facility] [place] for genetic consultation to discuss the options for routine screening for fetal chromosome abnormalities. Marie was accompanied to today's consult by her , Desmond.       Impression/Plan:   1.  Marie elected to proceed with NIPS through [facility] and opted to screen for sex chromosome aneuploidies including fetal sex. Results are expected in 7-14 business days, on average. Marie requested a detailed message with results on her voicemail, excluding the fetal sex information.She desires predicted fetal sex to be put into an envelope that the patient will  from our clinic. Patient was informed that results, including fetal sex, will be available via [facility].    2. Maternal serum AFP (single marker screen) is recommended after 15 weeks to screen for open neural tube defects. A quad screen should not be performed.    3.  Marie had an NT ultrasound today, please see the ultrasound report for details.     Pregnancy History:   /Parity:     Age at Delivery: 32 year old  ANGEL LUIS: 2023, by Last Menstrual Period  Gestational Age: 11w4d    No significant complications or exposures were reported in the current pregnancy.    This the couple's first pregnancy together    Marie reports currently taking a prenatal vitamin with DHA    Medical History:   Marie s reported medical history is significant for history of a DVT and thrombocytopenia. Please see documentation from 23 with Dr. Rocco Palacios MD regarding Marie's medical history and pregnancy management recommendations. Marie also follows with hematology and had a consult on 23. Please see documentation from 23 for recommendations about care and use of Lovenox in pregnancy and  "postpartum.       Family History:   A three-generation pedigree was obtained, and is scanned under the  Media  tab.     The following significant findings were reported by Marie:    Father of the baby: Desmond is 36 years old and in good health. He has no children with previous partners.     Marie and Desmond have a family history of cancer. Please see the pedigree for details. We briefly discussed the family history of cancer. Cancer most often occurs by chance, however some families seem to develop cancer more frequently than expected. Everyone has a risk to develop cancer, but individuals may be at an increased risk to develop cancer based on their family history. Genetic counseling is available for cancer syndromes. Cancer family history, even without genetic testing, can change cancer screening recommendations for family members and aid in insurance coverage for access to them as well. The most informative individuals to complete cancer genetic counseling and genetic testing are those with a personal history of cancer or those closely related to the affected individuals. We reviewed that if the family wants more information they can contact the [facility] [employer] Cancer Risk Management Program ([phone]). Physicians can also make referrals at https://[email or web address]/care/services/cancer-risk-management-program or, if within the [employer] system, through [facility] referral for \"Cancer Risk Mgmt/Cancer Genetic Counseling\".     Otherwise, the reported family history is negative for multiple miscarriages, stillbirths, birth defects, intellectual disabilities, known genetic conditions, and consanguinity.       Carrier Screening:   The patient reports that she and the father of the pregnancy have  ancestry:     The hemoglobinopathies are a group of genetic blood diseases that occur with increased frequency in individuals of  ancestry and carrier screening for these conditions is available.  Carrier " screening for the hemoglobinopathies includes a CBC with red blood cell indices, a ferritin level, and a quantitative hemoglobin electrophoresis or HPLC.  In addition,  screening in the [facility] includes many of the hemoglobinopathies.      Expanded carrier screening for mutations in a large panel of genes associated with autosomal recessive conditions including cystic fibrosis, spinal muscular atrophy, and others, is now available.      The patient has declined the carrier screening options reviewed today.       Risk Assessment for Chromosome Conditions:   We explained that the risk for fetal chromosome abnormalities increases with maternal age. We discussed specific features of common chromosome abnormalities, including Down syndrome, trisomy 13, trisomy 18, and sex chromosome trisomies.      - At age 32 at midtrimester, the risk to have a baby with Down syndrome is 1 in 421.     - At age 32 at midtrimester, the risk to have a baby with any chromosome abnormality is 1 in 217.    - At age 32 at delivery, the risk to have a baby with Down syndrome is 1 in 625.     - At age 32 at delivery, the risk to have a baby with any chromosome abnormality is 1 in 312.        Testing Options:   We discussed the following options:     First trimester screening    First trimester ultrasound with nuchal translucency and nasal bone assessments, maternal plasma hCG, NUNU-A, and AFP measurement    Screens for fetal trisomy 21, trisomy 13, and trisomy 18    Cannot screen for open neural tube defects; maternal serum AFP after 15 weeks is recommended       Non-invasive Prenatal Testing (NIPT)    Maternal plasma cell-free DNA testing; first trimester ultrasound with nuchal translucency and nasal bone assessment is recommended, when appropriate    Screens for fetal trisomy 21, trisomy 13, trisomy 18, and sex chromosome aneuploidy    Cannot screen for open neural tube defects; maternal serum AFP after 15 weeks is  recommended       Chorionic villus sampling (CVS)    Invasive procedure typically performed in the first trimester by which placental villi are obtained for the purpose of chromosome analysis and/or other prenatal genetic analysis    Diagnostic results; >99% sensitivity for fetal chromosome abnormalities    Cannot test for open neural tube defects; maternal serum AFP after 15 weeks is recommended       Genetic Amniocentesis    Invasive procedure typically performed in the second trimester by which amniotic fluid is obtained for the purpose of chromosome analysis and/or other prenatal genetic analysis    Diagnostic results; >99% sensitivity for fetal chromosome abnormalities    AFAFP measurement tests for open neural tube defects      NT Ultrasound     Assessment for the thickness of the nuchal translucency and the fetus' nasal bone performed between 66n7d-22z1n gestation    ~70% aneuploidy detection      We reviewed the benefits and limitations of this testing.  Screening tests provide a risk assessment specific to the pregnancy for certain fetal chromosome abnormalities, but cannot definitively diagnose or exclude a fetal chromosome abnormality.  Follow-up genetic counseling and consideration of diagnostic testing is recommended with any abnormal screening result.      It was a pleasure to be involved with [facility]. Face-to-face time of the meeting was 40 minutes.      Brittani Golden MS, [facility]  Genetic Counselor  [facility]  Maternal Fetal Medicine  Ph: [phone]   Pager: [phone]

## 2023-02-17 ENCOUNTER — TELEPHONE (OUTPATIENT)
Dept: OBGYN | Facility: CLINIC | Age: 33
End: 2023-02-17
Payer: COMMERCIAL

## 2023-02-17 ENCOUNTER — ALLIED HEALTH/NURSE VISIT (OUTPATIENT)
Dept: NURSING | Facility: CLINIC | Age: 33
End: 2023-02-17
Payer: COMMERCIAL

## 2023-02-17 ENCOUNTER — LAB (OUTPATIENT)
Dept: LAB | Facility: CLINIC | Age: 33
End: 2023-02-17
Payer: COMMERCIAL

## 2023-02-17 DIAGNOSIS — Z23 HIGH PRIORITY FOR 2019-NCOV VACCINE: Primary | ICD-10-CM

## 2023-02-17 DIAGNOSIS — O09.91 SUPERVISION OF HIGH RISK PREGNANCY IN FIRST TRIMESTER: Primary | ICD-10-CM

## 2023-02-17 DIAGNOSIS — O09.91 SUPERVISION OF HIGH RISK PREGNANCY IN FIRST TRIMESTER: ICD-10-CM

## 2023-02-17 LAB
ERYTHROCYTE [DISTWIDTH] IN BLOOD BY AUTOMATED COUNT: 12.8 % (ref 10–15)
HCT VFR BLD AUTO: 37.2 % (ref 35–47)
HGB BLD-MCNC: 12.1 G/DL (ref 11.7–15.7)
MCH RBC QN AUTO: 30.4 PG (ref 26.5–33)
MCHC RBC AUTO-ENTMCNC: 32.5 G/DL (ref 31.5–36.5)
MCV RBC AUTO: 94 FL (ref 78–100)
PLATELET # BLD AUTO: 124 10E3/UL (ref 150–450)
RBC # BLD AUTO: 3.98 10E6/UL (ref 3.8–5.2)
WBC # BLD AUTO: 8.7 10E3/UL (ref 4–11)

## 2023-02-17 PROCEDURE — 91313 COVID-19 VACCINE BIVALENT BOOSTER 18+ (MODERNA): CPT

## 2023-02-17 PROCEDURE — 99207 PR NO CHARGE NURSE ONLY: CPT

## 2023-02-17 PROCEDURE — 36415 COLL VENOUS BLD VENIPUNCTURE: CPT

## 2023-02-17 PROCEDURE — 0134A COVID-19 VACCINE BIVALENT BOOSTER 18+ (MODERNA): CPT

## 2023-02-17 PROCEDURE — 85027 COMPLETE CBC AUTOMATED: CPT

## 2023-02-17 NOTE — TELEPHONE ENCOUNTER
She has thrombocytopenia and is anticoagulated. We should check her platelet count. Agree with nasal spray

## 2023-02-17 NOTE — TELEPHONE ENCOUNTER
Order placed for CBC  Patient called to update, will come for repeat labs.  Patient will start saline nasal spray at this time.  Pt verbalized understanding, in agreement with plan, and voiced no further questions.  Transferred to scheduling  David Bennett RN on 2/17/2023 at 1:42 PM

## 2023-02-17 NOTE — TELEPHONE ENCOUNTER
Reason for call:  Symptom   Symptom or request: 12 weeks & Spotting    Duration (how long have symptoms been present): started today  Have you been treated for this before? No    Additional comments: none    Phone number to reach patient:  Cell number on file:    Telephone Information:   Mobile [phone]       Best Time:  today    Can we leave a detailed message on this number?  YES    Travel screening: Not Applicable

## 2023-02-24 ENCOUNTER — TELEPHONE (OUTPATIENT)
Dept: MATERNAL FETAL MEDICINE | Facility: CLINIC | Age: 33
End: 2023-02-24
Payer: COMMERCIAL

## 2023-02-24 LAB — SCANNED LAB RESULT: NORMAL

## 2023-02-24 NOTE — TELEPHONE ENCOUNTER
February 24, 2023   I spoke with Marie regarding her NIPT results.     Results indicate NO ANEUPLOIDY DETECTED for chromosomes 21, 18, 13, or sex chromosomes. Patient is planning to  predicted sex of baby information in an envelope from our [facility] location today.     This puts her current pregnancy at low risk for Down syndrome, trisomy 18, trisomy 13 and sex chromosome abnormalities. This test is reported to have the following sensitivities: Down syndrome: 99.99%, trisomy 18: 99.99%, and trisomy 13: 99.99%. Although these results are reassuring, this does not replace a standard chromosome analysis from a chorionic villus sampling or amniocentesis.     MSAFP is the appropriate second trimester screening test for open neural tube defects; the maternal quad screen is not recommended.    Her results are available in her Epic chart for her primary OB to review.     Rosio Martins MS, [facility]  Licensed Genetic Counselor  [facility]  Maternal Fetal Medicine  Ph: [phone]  [email or web address]

## 2023-02-27 ENCOUNTER — PRENATAL OFFICE VISIT (OUTPATIENT)
Dept: OBGYN | Facility: CLINIC | Age: 33
End: 2023-02-27
Payer: COMMERCIAL

## 2023-02-27 ENCOUNTER — LAB (OUTPATIENT)
Dept: LAB | Facility: CLINIC | Age: 33
End: 2023-02-27
Payer: COMMERCIAL

## 2023-02-27 VITALS — BODY MASS INDEX: 23.62 KG/M2 | WEIGHT: 125 LBS | DIASTOLIC BLOOD PRESSURE: 58 MMHG | SYSTOLIC BLOOD PRESSURE: 92 MMHG

## 2023-02-27 DIAGNOSIS — O99.119 THROMBOCYTOPENIA AFFECTING PREGNANCY, ANTEPARTUM (H): ICD-10-CM

## 2023-02-27 DIAGNOSIS — D69.6 THROMBOCYTOPENIA AFFECTING PREGNANCY, ANTEPARTUM (H): ICD-10-CM

## 2023-02-27 DIAGNOSIS — O09.92 SUPERVISION OF HIGH RISK PREGNANCY IN SECOND TRIMESTER: Primary | ICD-10-CM

## 2023-02-27 DIAGNOSIS — Z13.79 GENETIC SCREENING: ICD-10-CM

## 2023-02-27 LAB
ERYTHROCYTE [DISTWIDTH] IN BLOOD BY AUTOMATED COUNT: 13 % (ref 10–15)
HCT VFR BLD AUTO: 35.5 % (ref 35–47)
HGB BLD-MCNC: 11.7 G/DL (ref 11.7–15.7)
MCH RBC QN AUTO: 30.6 PG (ref 26.5–33)
MCHC RBC AUTO-ENTMCNC: 33 G/DL (ref 31.5–36.5)
MCV RBC AUTO: 93 FL (ref 78–100)
PLATELET # BLD AUTO: 107 10E3/UL (ref 150–450)
RBC # BLD AUTO: 3.82 10E6/UL (ref 3.8–5.2)
WBC # BLD AUTO: 9.5 10E3/UL (ref 4–11)

## 2023-02-27 PROCEDURE — 85027 COMPLETE CBC AUTOMATED: CPT

## 2023-02-27 PROCEDURE — 99207 PR PRENATAL VISIT: CPT | Performed by: OBSTETRICS & GYNECOLOGY

## 2023-02-27 PROCEDURE — 36415 COLL VENOUS BLD VENIPUNCTURE: CPT

## 2023-02-27 NOTE — PROGRESS NOTES
Doing much better. Having some residual abdominal and lower back pain. Since her visit with Hematology she has opted to only do postpartum anticoagulation. CBC per hematology. NIPT low risk per MFM. AFP at her next visit  RTC in 4 weeks with me.  Amira Schmidt MD

## 2023-03-02 ENCOUNTER — TELEPHONE (OUTPATIENT)
Dept: OBGYN | Facility: CLINIC | Age: 33
End: 2023-03-02
Payer: COMMERCIAL

## 2023-03-02 NOTE — TELEPHONE ENCOUNTER
Type of Paperwork received:  accomidations     Date Rcvd:  3/2/2023    Rcvd From (Company name): [employer]    Provider:  Roxana    Placed on Provider Cart Date:  3/2/2023

## 2023-03-04 ENCOUNTER — NURSE TRIAGE (OUTPATIENT)
Dept: NURSING | Facility: CLINIC | Age: 33
End: 2023-03-04
Payer: COMMERCIAL

## 2023-03-04 NOTE — TELEPHONE ENCOUNTER
"Nurse Triage SBAR    Is this a 2nd Level Triage? NO    Situation: Patient is having black stools    Background:  Patient is 14 wks pregnant.  Patient is noticing black stools and she first noticed these 2 weeks ago. The only medication currently taking is a multivitamin with iron (prenatal) .Patient also states that she was slightly constipated and when she wiped she saw a small amount of bright red blood on toilet paper.    Assessment: Patient had an appointment on 2/27 with OB/GYN and forgot to mention the stools. Patient is also having occasional abdominal pain    Protocol Recommended Disposition:   See PCP Within 3 Days, See More Appropriate Guideline    Recommendation: Patient will go to ED/UC if she passes any large blood clots or abdominal pain becomes worse.       Routed to provider     Melani Millard RN on 3/4/2023 at 9:43 AM    Does the patient meet one of the following criteria for ADS visit consideration? 16+ years old, with an [facility] PCP     TIP  Providers, please consider if this condition is appropriate for management at one of our Acute and Diagnostic Services sites.     If patient is a good candidate, please use dotphrase <dot>triageresponse and select Refer to ADS to document.  Reason for Disposition    Black or tarry bowel movements    MILD rectal bleeding (more than just a few drops or streaks)    Additional Information    Negative: Patient sounds very sick or weak to the triager    Negative: [1] Stool is light gray or whitish AND [2] unexplained    Negative: [1] Stool is mucus or pus AND [2] persists > 24 hours    Negative: Shock suspected (e.g., cold/pale/clammy skin, too weak to stand, low BP, rapid pulse)    Negative: Difficult to awaken or acting confused (e.g., disoriented, slurred speech)    Negative: Passed out (i.e., lost consciousness, collapsed and was not responding)    Negative: [1] Vomiting AND [2] contains red blood or black (\"coffee ground\") material  (Exception: few red streaks in " vomit that only happened once)    Negative: Sounds like a life-threatening emergency to the triager    Negative: SEVERE rectal bleeding (large blood clots; constant or on and off bleeding)    Negative: SEVERE dizziness (e.g., unable to stand, requires support to walk, feels like passing out now)    Negative: [1] MODERATE rectal bleeding (small blood clots, passing blood without stool, or toilet water turns red) AND [2] more than once a day    Negative: Pale skin (pallor) of new-onset or worsening    Negative: Black or tarry bowel movements (Exception: chronic-unchanged black-grey bowel movements AND is taking iron pills or Pepto-bismol)    Negative: [1] Constant abdominal pain AND [2] present > 2 hours    Negative: High-risk adult (e.g., prior surgery on aorta, abdominal aortic aneurysm)    Negative: Taking Coumadin (warfarin) or other strong blood thinner, or known bleeding disorder (e.g., thrombocytopenia)    Negative: Known cirrhosis of the liver (or history of liver failure or ascites)    Negative: [1] Colonoscopy AND [2] in past 72 hours    Negative: Patient sounds very sick or weak to the triager    Negative: MODERATE rectal bleeding (small blood clots, passing blood without stool, or toilet water turns red)    Protocols used: STOOLS - UNUSUAL COLOR-A-AH, RECTAL BLEEDING-A-AH

## 2023-03-06 NOTE — TELEPHONE ENCOUNTER
Patient returned call to clinic.   Had noticed stool appeared to be a darker color/ almost black. Currently taking a prenatal vitamin with iron. This is the only med she is taking. No other medications or supplements. Has not used any otc products recently. Patient did have one episode of bleeding where she noticed blood on toilet paper. Was a small amount and has not had any reoccurrence.   Also has had intermittent  abdominal pain. Not currently having abdominal pain. When she does have it is dull and mild achiness. It is lighter than what she has experienced with endometriosis abdominal pain and no cramping.     Patient was told to monitor for reoccurrence of bleeding and abdominal pain. Discussed eating high fiber foods and staying hydrated. Also information given on taking a stool softener.      Janna Woods RN

## 2023-03-10 ENCOUNTER — TELEPHONE (OUTPATIENT)
Dept: HEMATOLOGY | Facility: CLINIC | Age: 33
End: 2023-03-10

## 2023-03-15 ENCOUNTER — LAB (OUTPATIENT)
Dept: LAB | Facility: CLINIC | Age: 33
End: 2023-03-15
Payer: COMMERCIAL

## 2023-03-15 DIAGNOSIS — D69.6 THROMBOCYTOPENIA AFFECTING PREGNANCY, ANTEPARTUM (H): ICD-10-CM

## 2023-03-15 DIAGNOSIS — O99.119 THROMBOCYTOPENIA AFFECTING PREGNANCY, ANTEPARTUM (H): ICD-10-CM

## 2023-03-15 LAB
ERYTHROCYTE [DISTWIDTH] IN BLOOD BY AUTOMATED COUNT: 12.8 % (ref 10–15)
HCT VFR BLD AUTO: 34.1 % (ref 35–47)
HGB BLD-MCNC: 11.4 G/DL (ref 11.7–15.7)
MCH RBC QN AUTO: 30.9 PG (ref 26.5–33)
MCHC RBC AUTO-ENTMCNC: 33.4 G/DL (ref 31.5–36.5)
MCV RBC AUTO: 92 FL (ref 78–100)
PLATELET # BLD AUTO: 125 10E3/UL (ref 150–450)
RBC # BLD AUTO: 3.69 10E6/UL (ref 3.8–5.2)
WBC # BLD AUTO: 9.7 10E3/UL (ref 4–11)

## 2023-03-15 PROCEDURE — 36415 COLL VENOUS BLD VENIPUNCTURE: CPT

## 2023-03-15 PROCEDURE — 85027 COMPLETE CBC AUTOMATED: CPT

## 2023-03-21 NOTE — PROGRESS NOTES
[facility]  [address] [place] [address] [address], [place] [address]  Main: [phone], Fax: [phone]      Video Virtual Visit Note:    Patient: Marie Ball  MRN: [id number]  : 1990  LAURA: 2023      Due to the ongoing COVID-19 outbreak, this visit was conducted by video, with the patient's approval.      Reason of today's visit:  Follow up    Clinical History Summary:  Marie Ball is a 32 year old  female with past medical history significant for endometriosis and thrombocytopenia (140-160K) who had a provoked DVT in setting of immobilization, prolonged travel after avulsion fracture while on recently started estrogen containing oral contraceptives. She is now 9w6d pregnant and her platelets have been within 94-96K. She established care with our team recently for evaluation of thrombocytopenia in pregnancy and for consideration of prophylactic anticoagulation with history of provoked DVT while on estrogen.      Given history of mild thrombocytopenia prior to pregnancy (140K) and platelet count <100K in first trimester, her thrombocytopenia is most likely consistent with immune thrombocytopenia rather than gestational thrombocytopenia. Fortunately, she has not had significant bleeding history. She has tolerated Eliquis in the past without exacerbation in easy bruising/bleeding symptoms.      Her prior venous thromboembolism was likely in part related to new start of OCPs several months prior but she also suffered avulsion fracture requiring immobilization and also participated in long distance travel while likely compounded her risk for development of venous thromboembolism. Her thrombophilia evaluation was unremarkable.     Her platelet counts in first trimester were between 94-96K. She has been getting twice monthly platelet counts which have shown platelet counts stabilized in the 107-125K range.     At her last appointment, she was counseled  on the use of anticoagulation for secondary prevention of venous thromboembolism during pregnancy given history of estrogen provoked venous thromboembolism as long as platelet counts were >50K. She opted to monitor platelet counts and discuss further at a later date. She was agreeable to use Lovenox at prophylactic intensity postpartum.     Interim History:  Today, Marie notes that she is doing quite well. She notes that she has had infrequent nuisance bleeding episodes. She reports two bouts of epistaxis lasting 10-15 minutes in setting of dry weather and nasal irritation. She did not require any medical attention. She reports ongoing rare gingival bleeding unchanged in frequency or severity to her pre pregnancy symptoms. She also had 1-2 bouts of BRBPR after wiping. This has resolved since increasing hydration and increasing fiber in her diet. She reports some mild dyspnea on exertion when going up stairs that is not progressive. She also reports 1-2 episodes of pelvic pain that radiated to foot that resolved with rest. She has been sleeping on her side and notes occasional nocturnal calf cramping that resolves with massage or ambulation. No edema or persistent leg pain/cramping sensation.     ROS:  Denies any bleeding complications. Denies any shortness of breath. No chest pain. No cough.     Medications:   Current Outpatient Medications   Medication Sig Dispense Refill     enoxaparin ANTICOAGULANT (LOVENOX) 40 MG/0.4ML syringe Inject 0.4 mLs (40 mg) Subcutaneous daily (Patient not taking: Reported on 2/27/2023) 12 mL 11     Prenatal Vit-Fe Sulfate-FA-DHA (PRENATAL VITAMIN/MIN +DHA PO)           Allergies:    No Known Allergies    PMH:   Past Medical History:   Diagnosis Date     DVT (deep venous thrombosis) (H) 10/2021    Previous ankle fx (cast)/OCP     Endometriosis        Social History:   Social History     Tobacco Use     Smoking status: Never     Smokeless tobacco: Never   Vaping Use     Vaping Use:  Never used   Substance Use Topics     Alcohol use: Not Currently     Drug use: Never       Family History:  Deferred.     Objective:  Visual Examination via Video:  Pleasant in no acute distress.  Normal work of breathing   A+O x 3    Labs:  CBC RESULTS:   Recent Labs   Lab Test 03/15/23  1513   WBC 9.7   RBC 3.69*   HGB 11.4*   HCT 34.1*   MCV 92   MCH 30.9   MCHC 33.4   RDW 12.8   *     Last Comprehensive Metabolic Panel:  No results found for: NA, POTASSIUM, CHLORIDE, CO2, ANIONGAP, GLC, BUN, CR, GFRESTIMATED, CATHERINE  Liver Function Studies - No results for input(s): PROTTOTAL, ALBUMIN, BILITOTAL, ALKPHOS, AST, ALT, BILIDIRECT in the last 62363 hours.      Imaging:  No results found for this or any previous visit (from the past 744 hour(s)).     Assessment:   In summary, Marie Ball is a 32 year old female with a history of:     1. Provoked distal lower extremity DVT following avulsion fracture, immobilization while on estrogen containing OCPs (recently started prior to event)   - Was treated with Eliquis for 3 months then discontinued.  2. Pregnant, 16w5d   3. Thrombocytopenia - felt more likely to be immune thrombocytopenia rather than gestational however will need to follow platelet counts postpartum to determine if persistently low. Plt counts historically 147-162K, during pregnancy 94-125K. Nuisance bleeding episodes including BRBPR, epistaxis x 2 and gingival bleeding.     Again discussed options including:   - Initiation of anticoagulation with Lovenox during pregnancy/postpartum with close monitoring and holding of Lovenox if platelet count drops <50K.   - Initiation of anticoagulation with Lovenox postpartum x 6 weeks with close monitoring and holding of Lovenox if platelet count drops below 50K and encouraging hydration, exercise, avoidance of immobilization, and use of compression garments with close monitoring of signs/symptoms of venous thromboembolism and low threshold for US imaging if any  symptoms occur. She would like to continue with this plan given her nuisance bleeding that may be exacerbated on Lovenox however she will revisit with me in early third trimester to re-evaluate prophylaxis and delivery plan.       - It is reasonable for her to be on anticoagulation including prophylactic dose of Lovenox as long as platelet count is >50K.   - For neuroaxial anesthesia, platelet count of 75K is required to avoid epidural hematoma formation. Some anesthesiologists prefer 100K goal.   - If platelet count is <75K, vacuum assisted or forceps assisted delivery is advised against.   - There is an increased risk of bleeding if platelet count is <20-30K for vaginal delivery or <50K for .   - Treatment is indicated when platelet count drops below 20-30K for procedures and delivery as there is a risk of spontaneous bleeding this platelet level.   - Treatment to increase platelet counts for ITP include steroid administration or IVIG. If there is no response to steroids, then platelet transfusions are considered. Antifibrinolytics are also used as adjunctive therapy perioperatively.     Plan:  It is reasonable to continue to monitor platelet count and conservative measures for venous thromboembolism prevention given that her prior venous thromboembolism was not only in setting of estrogen but also following traumatic fracture, immobilization and prolonged travel. I did discuss that if she becomes more sedentary or had other pregnancy complications requiring bed rest, we may want to reconsider initiation of prophylactic Lovenox. If she does travel > 4-6 hours would recommend use of Lovenox on travel days.     Continue CBC with platelet counts every 2 weeks. If <80K, then should schedule clinic visit. Otherwise, will revisit with her around 30 weeks to revisit prophylaxis plan and to discuss delivery plan in more detail. Will plan for prophylactic Lovenox for 6 weeks postpartum.     Reviewed signs/symptoms  of when to call for triage and imaging.     All questions answered to her satisfaction. She is agreeable to plan.       Video-Visit Details:  Type of service:  Video Visit  Joined the call at 3/22/2023, 10:01:31 am.  Left the call at 3/22/2023, 10:18:55 am.  You were on the call for 17 minutes 23 seconds .  Originating Location (pt. Location): Home  Distant Location (provider location):  [facility]   Mode of Communication:  Video Conference via [facility]      Saima Martinez, MPH, PA-C  [facility]    25 minutes spent on the date of the encounter doing chart review, review of outside records, review of test results, interpretation of tests, patient visit and documentation

## 2023-03-22 ENCOUNTER — VIRTUAL VISIT (OUTPATIENT)
Dept: HEMATOLOGY | Facility: CLINIC | Age: 33
End: 2023-03-22
Attending: PHYSICIAN ASSISTANT
Payer: COMMERCIAL

## 2023-03-22 DIAGNOSIS — O99.119 THROMBOCYTOPENIA AFFECTING PREGNANCY, ANTEPARTUM (H): Primary | ICD-10-CM

## 2023-03-22 DIAGNOSIS — Z33.1 PREGNANCY, INCIDENTAL: ICD-10-CM

## 2023-03-22 DIAGNOSIS — D69.6 THROMBOCYTOPENIA AFFECTING PREGNANCY, ANTEPARTUM (H): Primary | ICD-10-CM

## 2023-03-22 DIAGNOSIS — Z86.718 PERSONAL HISTORY OF DVT (DEEP VEIN THROMBOSIS): ICD-10-CM

## 2023-03-22 PROCEDURE — 99213 OFFICE O/P EST LOW 20 MIN: CPT | Mod: VID | Performed by: PHYSICIAN ASSISTANT

## 2023-03-22 NOTE — PATIENT INSTRUCTIONS
[facility]  [facility]  [facility] [address], [address], [place] [address]  Main: [phone], Fax: [phone]    Marie,   It was a pleasure seeing you today.  Thank you for allowing us to be involved in your care.  Please let us know if there is anything else we can do for you, so that we can be sure you are leaving completely satisfied with your care experience. Below is the plan that we discussed.     Continue to stay active, hydrated   Call if you notice any worsening bleeding symptoms or frequency of nosebleeds/gum bleeding.   Call if you have worsening leg pain or swelling that is persistent.   Try drinking beverage with electrolytes in it to help with leg cramping. Magnesium or tonic water can also help with this.   Continue to have platelet labs every 2 weeks.     Follow up around 30 weeks.      Your nurse clinician is Melvi Johnson, MSN, RN, [phone].   If they are unavailable and you have immediate concerns, please call [phone] and ask for a nurse.     Saima Martinez, MPH, PA-C  [facility]

## 2023-03-27 ENCOUNTER — PRENATAL OFFICE VISIT (OUTPATIENT)
Dept: OBGYN | Facility: CLINIC | Age: 33
End: 2023-03-27
Payer: COMMERCIAL

## 2023-03-27 VITALS — SYSTOLIC BLOOD PRESSURE: 98 MMHG | BODY MASS INDEX: 24.37 KG/M2 | WEIGHT: 129 LBS | DIASTOLIC BLOOD PRESSURE: 56 MMHG

## 2023-03-27 DIAGNOSIS — R10.9 ABDOMINAL PAIN AFFECTING PREGNANCY: ICD-10-CM

## 2023-03-27 DIAGNOSIS — Z36.1 ENCOUNTER FOR ANTENATAL SCREENING FOR RAISED ALPHAFETOPROTEIN LEVEL: ICD-10-CM

## 2023-03-27 DIAGNOSIS — O09.92 SUPERVISION OF HIGH RISK PREGNANCY IN SECOND TRIMESTER: Primary | ICD-10-CM

## 2023-03-27 DIAGNOSIS — O26.899 ABDOMINAL PAIN AFFECTING PREGNANCY: ICD-10-CM

## 2023-03-27 PROCEDURE — 82105 ALPHA-FETOPROTEIN SERUM: CPT | Mod: 90 | Performed by: OBSTETRICS & GYNECOLOGY

## 2023-03-27 PROCEDURE — 36415 COLL VENOUS BLD VENIPUNCTURE: CPT | Performed by: OBSTETRICS & GYNECOLOGY

## 2023-03-27 PROCEDURE — 99207 PR PRENATAL VISIT: CPT | Performed by: OBSTETRICS & GYNECOLOGY

## 2023-03-27 PROCEDURE — 99000 SPECIMEN HANDLING OFFICE-LAB: CPT | Performed by: OBSTETRICS & GYNECOLOGY

## 2023-03-27 PROCEDURE — 87086 URINE CULTURE/COLONY COUNT: CPT | Performed by: OBSTETRICS & GYNECOLOGY

## 2023-03-27 NOTE — PROGRESS NOTES
Prenatal Visit: having a lower abdominal pain over the past 3 to 4 days. She has noted that it is better with movement and worse with prolonged sitting. The pain is in her lower abdomen and in her lower back. No different discharge and no painful urination.  SSE: cervix is closed. Physiologic leukoria  Will send a urine culture to be complete  Pain is likely a mixture of round ligament and MSK strain  RTC in 4 weeks pending normal testing  Amira Schmidt MD

## 2023-03-29 LAB
# FETUSES US: NORMAL
AFP MOM SERPL: 0.95
AFP SERPL-MCNC: 44 NG/ML
AGE - REPORTED: 33.8 YR
BACTERIA UR CULT: NORMAL
CURRENT SMOKER: NO
FAMILY MEMBER DISEASES HX: NO
GA METHOD: NORMAL
GA: NORMAL WK
IDDM PATIENT QL: NO
INTEGRATED SCN PATIENT-IMP: NORMAL
SPECIMEN DRAWN SERPL: NORMAL

## 2023-04-04 ENCOUNTER — OFFICE VISIT (OUTPATIENT)
Dept: MATERNAL FETAL MEDICINE | Facility: CLINIC | Age: 33
End: 2023-04-04
Attending: OBSTETRICS & GYNECOLOGY
Payer: COMMERCIAL

## 2023-04-04 ENCOUNTER — HOSPITAL ENCOUNTER (OUTPATIENT)
Dept: ULTRASOUND IMAGING | Facility: CLINIC | Age: 33
Discharge: HOME | End: 2023-04-04
Attending: OBSTETRICS & GYNECOLOGY
Payer: COMMERCIAL

## 2023-04-04 ENCOUNTER — LAB (OUTPATIENT)
Dept: LAB | Facility: CLINIC | Age: 33
End: 2023-04-04
Attending: OBSTETRICS & GYNECOLOGY
Payer: COMMERCIAL

## 2023-04-04 DIAGNOSIS — D69.6 THROMBOCYTOPENIA AFFECTING PREGNANCY, ANTEPARTUM (H): ICD-10-CM

## 2023-04-04 DIAGNOSIS — O99.119 THROMBOCYTOPENIA AFFECTING PREGNANCY, ANTEPARTUM (H): ICD-10-CM

## 2023-04-04 DIAGNOSIS — D69.6: ICD-10-CM

## 2023-04-04 DIAGNOSIS — O99.119: ICD-10-CM

## 2023-04-04 DIAGNOSIS — Z36.2 ENCOUNTER FOR FOLLOW-UP ULTRASOUND OF FETAL ANATOMY: Primary | ICD-10-CM

## 2023-04-04 DIAGNOSIS — Z86.2 HISTORY OF AUTOIMMUNE THROMBOCYTOPENIA: ICD-10-CM

## 2023-04-04 LAB
ERYTHROCYTE [DISTWIDTH] IN BLOOD BY AUTOMATED COUNT: 13.3 % (ref 10–15)
HCT VFR BLD AUTO: 34.4 % (ref 35–47)
HGB BLD-MCNC: 11.4 G/DL (ref 11.7–15.7)
MCH RBC QN AUTO: 31.3 PG (ref 26.5–33)
MCHC RBC AUTO-ENTMCNC: 33.1 G/DL (ref 31.5–36.5)
MCV RBC AUTO: 95 FL (ref 78–100)
PLATELET # BLD AUTO: 119 10E3/UL (ref 150–450)
RBC # BLD AUTO: 3.64 10E6/UL (ref 3.8–5.2)
WBC # BLD AUTO: 10.2 10E3/UL (ref 4–11)

## 2023-04-04 PROCEDURE — 36415 COLL VENOUS BLD VENIPUNCTURE: CPT

## 2023-04-04 PROCEDURE — 85027 COMPLETE CBC AUTOMATED: CPT

## 2023-04-04 PROCEDURE — 99213 OFFICE O/P EST LOW 20 MIN: CPT | Mod: 25 | Performed by: OBSTETRICS & GYNECOLOGY

## 2023-04-04 PROCEDURE — 76811 OB US DETAILED SNGL FETUS: CPT

## 2023-04-04 PROCEDURE — 76811 OB US DETAILED SNGL FETUS: CPT | Mod: 26 | Performed by: OBSTETRICS & GYNECOLOGY

## 2023-04-04 NOTE — NURSING NOTE
Patient presents to [facility] for L2. Denies LOF, vaginal bleeding, cramping/contractions. SBAR given to [facility], see their note in Epic.    Ольга Coleman RN

## 2023-04-04 NOTE — PROGRESS NOTES
[facility] Visit    Marie presents to [facility] clinic for ultrasound and recommendations. The following problems were addressed:    Immune thrombocytopenia in pregnancy    Tests Reviewed: cell free DNA screen  Tests Ordered: follow up obstetric ultrasound  Unique Records reviewed: na    Impression:  1) Hoffman intrauterine pregnancy at 18w 4d gestational age.   2) None of the anomalies commonly detected by ultrasound were evident in the detailed fetal anatomic survey, however some anatomy was seen suboptimally as outlined above.   3) Growth parameters and estimated fetal weight were consistent with established dates.  4) The amniotic fluid volume appeared normal.  5) Normal fetal activity for gestational age.  6) On transabdominal imaging the cervix appears long and closed.    Plan:  Thank-you for referring your patient for a comprehensive ultrasound.  She had cell-free DNA screening showing the expected amounts of chromosomes 21, 18 & 13.    I discussed the findings on today's ultrasound with the patient.     Follow-up is scheduled here in 4 weeks to reassess anatomy that was suboptimally seen today.     Return to primary provider for continued prenatal care.    If you have questions regarding today's evaluation or if we can be of further service, please contact the [facility].    **Fetal anomalies may be present but not detected**    Jeanne Carpenter MD  Maternal Fetal Medicine    Total Time: 13 minutes spent on the date of the encounter doing chart review, history and exam, documentation and further activities as noted above.

## 2023-04-11 ENCOUNTER — TELEPHONE (OUTPATIENT)
Dept: OBGYN | Facility: CLINIC | Age: 33
End: 2023-04-11
Payer: COMMERCIAL

## 2023-04-11 NOTE — TELEPHONE ENCOUNTER
Called pt and discussed. Denies any SOB, dyspnea, chest pain or discomfort but notices at time she can hear her heart beat. It is not fast but loud.  Reassured pt that this is common w the cardiac changes going on in pregnancy. Be seen if SOB, chest pain or tightness to be seen in ER if accompanies this symptom.    Pt feeling fine now and just worried. Reassured by explanation.    Geno Perry RN on 4/11/2023 at 1:51 PM

## 2023-04-11 NOTE — TELEPHONE ENCOUNTER
[id number], patient of Dr. Schmidt, and calling with complaint: Heart Beating really loud- can hear it.    Patient noticed the loud heart beating a week ago. This happens daily, but comes and goes. Patient states this is even when she's at rest/sleeping. Patient denies fever, dizziness, and indicates no change in diet/fluids.

## 2023-04-16 ENCOUNTER — NURSE TRIAGE (OUTPATIENT)
Dept: NURSING | Facility: CLINIC | Age: 33
End: 2023-04-16
Payer: COMMERCIAL

## 2023-04-16 NOTE — TELEPHONE ENCOUNTER
Nurse Triage SBAR    Is this a 2nd Level Triage? NO    Situation: Patient asking about taking OTC meds for reflux    Background: Patient reports being 20 weeks pregnant.  Now having reflux and wanting info about the safety of OTC medications.    Assessment: Patient with medication question.    Protocol Recommended Disposition:   Home Care - Information or Advice Only Call    Recommendation: Advised patient for available medications for reflux from OneNote list. Advised patient that reflux can be a sign of a more serious problem and that she should notify her OB that she is now experiencing same. Patient verbalized understanding.     Info call    Does the patient meet one of the following criteria for ADS visit consideration? No     Reason for Disposition    Health Information question, no triage required and triager able to answer question    Additional Information    Negative: [1] Caller is not with the adult (patient) AND [2] reporting urgent symptoms    Negative: Lab result questions    Negative: Medication questions    Negative: Caller can't be reached by phone    Negative: Caller has already spoken to PCP or another triager    Negative: RN needs further essential information from caller in order to complete triage    Negative: Requesting regular office appointment    Negative: [1] Caller requesting NON-URGENT health information AND [2] PCP's office is the best resource    Protocols used: INFORMATION ONLY CALL - NO TRIAGE-[facility]    Arsalan Angela RN, BSN, MSN  [facility] Triage 12:35 PM

## 2023-04-24 ENCOUNTER — LAB (OUTPATIENT)
Dept: LAB | Facility: CLINIC | Age: 33
End: 2023-04-24
Attending: OBSTETRICS & GYNECOLOGY
Payer: COMMERCIAL

## 2023-04-24 ENCOUNTER — PRENATAL OFFICE VISIT (OUTPATIENT)
Dept: OBGYN | Facility: CLINIC | Age: 33
End: 2023-04-24
Payer: COMMERCIAL

## 2023-04-24 ENCOUNTER — HOSPITAL ENCOUNTER (OUTPATIENT)
Dept: ULTRASOUND IMAGING | Facility: CLINIC | Age: 33
Discharge: HOME | End: 2023-04-24
Attending: OBSTETRICS & GYNECOLOGY
Payer: COMMERCIAL

## 2023-04-24 VITALS — DIASTOLIC BLOOD PRESSURE: 60 MMHG | SYSTOLIC BLOOD PRESSURE: 96 MMHG | BODY MASS INDEX: 25.32 KG/M2 | WEIGHT: 134 LBS

## 2023-04-24 DIAGNOSIS — O09.92 SUPERVISION OF HIGH RISK PREGNANCY IN SECOND TRIMESTER: Primary | ICD-10-CM

## 2023-04-24 DIAGNOSIS — M79.89 LEFT LEG SWELLING: ICD-10-CM

## 2023-04-24 DIAGNOSIS — D69.6 THROMBOCYTOPENIA AFFECTING PREGNANCY, ANTEPARTUM (H): ICD-10-CM

## 2023-04-24 DIAGNOSIS — O99.119 THROMBOCYTOPENIA AFFECTING PREGNANCY, ANTEPARTUM (H): ICD-10-CM

## 2023-04-24 LAB
ERYTHROCYTE [DISTWIDTH] IN BLOOD BY AUTOMATED COUNT: 13.3 % (ref 10–15)
HCT VFR BLD AUTO: 34.6 % (ref 35–47)
HGB BLD-MCNC: 11.3 G/DL (ref 11.7–15.7)
MCH RBC QN AUTO: 30.7 PG (ref 26.5–33)
MCHC RBC AUTO-ENTMCNC: 32.7 G/DL (ref 31.5–36.5)
MCV RBC AUTO: 94 FL (ref 78–100)
PLATELET # BLD AUTO: 117 10E3/UL (ref 150–450)
RBC # BLD AUTO: 3.68 10E6/UL (ref 3.8–5.2)
WBC # BLD AUTO: 8.9 10E3/UL (ref 4–11)

## 2023-04-24 PROCEDURE — 36415 COLL VENOUS BLD VENIPUNCTURE: CPT

## 2023-04-24 PROCEDURE — 99207 PR PRENATAL VISIT: CPT | Performed by: OBSTETRICS & GYNECOLOGY

## 2023-04-24 PROCEDURE — 85027 COMPLETE CBC AUTOMATED: CPT

## 2023-04-24 PROCEDURE — 93971 EXTREMITY STUDY: CPT | Mod: LT

## 2023-04-24 NOTE — PROGRESS NOTES
Prenatal Visit. Left ankle and foot swelling that is more than the right side. This was also the side that had a DVT. She does not recall any recent injury. The swelling is not affected by the time of the day and there is no pain with ambulation. On visual inspection the left ankle is noticeably slightly larger than the right ankle. Given her history I recommend a LE doppler study.  She has also been having lower back strain and right sided SI pain. I recommend bird/dog exercises, use of an SI belt and working with a Chiropractor if these measures are not helpful.  Continue follow up with Hematology  RTC in 4 weeks  Amira Schmidt MD

## 2023-05-02 ENCOUNTER — OFFICE VISIT (OUTPATIENT)
Dept: MATERNAL FETAL MEDICINE | Facility: CLINIC | Age: 33
End: 2023-05-02
Attending: OBSTETRICS & GYNECOLOGY
Payer: COMMERCIAL

## 2023-05-02 ENCOUNTER — HOSPITAL ENCOUNTER (OUTPATIENT)
Dept: ULTRASOUND IMAGING | Facility: CLINIC | Age: 33
Discharge: HOME | End: 2023-05-02
Attending: OBSTETRICS & GYNECOLOGY
Payer: COMMERCIAL

## 2023-05-02 DIAGNOSIS — Z86.718 HISTORY OF DEEP VENOUS THROMBOSIS: ICD-10-CM

## 2023-05-02 DIAGNOSIS — D69.6: Primary | ICD-10-CM

## 2023-05-02 DIAGNOSIS — O99.119: Primary | ICD-10-CM

## 2023-05-02 DIAGNOSIS — Z36.2 ENCOUNTER FOR FOLLOW-UP ULTRASOUND OF FETAL ANATOMY: ICD-10-CM

## 2023-05-02 DIAGNOSIS — Z86.2 HISTORY OF AUTOIMMUNE THROMBOCYTOPENIA: ICD-10-CM

## 2023-05-02 PROCEDURE — 76816 OB US FOLLOW-UP PER FETUS: CPT | Mod: 26 | Performed by: OBSTETRICS & GYNECOLOGY

## 2023-05-02 PROCEDURE — 99212 OFFICE O/P EST SF 10 MIN: CPT | Mod: 25 | Performed by: OBSTETRICS & GYNECOLOGY

## 2023-05-02 PROCEDURE — 76816 OB US FOLLOW-UP PER FETUS: CPT

## 2023-05-02 NOTE — NURSING NOTE
Patient presents to [facility] for RL2. Denies LOF, vaginal bleeding, cramping/contractions. SBAR given to MARISOL MD, see their note in Epic.

## 2023-05-18 ENCOUNTER — LAB (OUTPATIENT)
Dept: LAB | Facility: CLINIC | Age: 33
End: 2023-05-18
Payer: COMMERCIAL

## 2023-05-18 DIAGNOSIS — D69.6 THROMBOCYTOPENIA AFFECTING PREGNANCY, ANTEPARTUM (H): ICD-10-CM

## 2023-05-18 DIAGNOSIS — O99.119 THROMBOCYTOPENIA AFFECTING PREGNANCY, ANTEPARTUM (H): ICD-10-CM

## 2023-05-18 LAB
ERYTHROCYTE [DISTWIDTH] IN BLOOD BY AUTOMATED COUNT: 13.1 % (ref 10–15)
HCT VFR BLD AUTO: 35.1 % (ref 35–47)
HGB BLD-MCNC: 11.7 G/DL (ref 11.7–15.7)
MCH RBC QN AUTO: 31.9 PG (ref 26.5–33)
MCHC RBC AUTO-ENTMCNC: 33.3 G/DL (ref 31.5–36.5)
MCV RBC AUTO: 96 FL (ref 78–100)
PLATELET # BLD AUTO: 122 10E3/UL (ref 150–450)
RBC # BLD AUTO: 3.67 10E6/UL (ref 3.8–5.2)
WBC # BLD AUTO: 8.9 10E3/UL (ref 4–11)

## 2023-05-18 PROCEDURE — 85018 HEMOGLOBIN: CPT

## 2023-05-18 PROCEDURE — 36415 COLL VENOUS BLD VENIPUNCTURE: CPT

## 2023-05-22 ENCOUNTER — PRENATAL OFFICE VISIT (OUTPATIENT)
Dept: OBGYN | Facility: CLINIC | Age: 33
End: 2023-05-22
Payer: COMMERCIAL

## 2023-05-22 VITALS — BODY MASS INDEX: 26.64 KG/M2 | SYSTOLIC BLOOD PRESSURE: 106 MMHG | DIASTOLIC BLOOD PRESSURE: 60 MMHG | WEIGHT: 141 LBS

## 2023-05-22 DIAGNOSIS — O09.92 SUPERVISION OF HIGH RISK PREGNANCY IN SECOND TRIMESTER: Primary | ICD-10-CM

## 2023-05-22 PROCEDURE — 99207 PR PRENATAL VISIT: CPT | Performed by: OBSTETRICS & GYNECOLOGY

## 2023-05-22 NOTE — PROGRESS NOTES
Prenatal Visit: Marie reports random episodes of SOB/ air hunger. She has had more anxiety recently as well. She would like to defer considering medication intervention at this time. Discussed that she is having mild panic attacks. I recommend Lexapro 5mg. I also recommend therapy as well. Discussed her upcoming visit. The glucola and my recommendation for getting the TDAP vaccine.  RTC in 2 weeks.  Amira Schmidt MD

## 2023-05-24 ENCOUNTER — TELEPHONE (OUTPATIENT)
Dept: HEMATOLOGY | Facility: CLINIC | Age: 33
End: 2023-05-24
Payer: COMMERCIAL

## 2023-06-05 DIAGNOSIS — Z36.9 ENCOUNTER FOR ANTENATAL SCREENING OF MOTHER: Primary | ICD-10-CM

## 2023-06-05 DIAGNOSIS — Z23 NEED FOR DIPHTHERIA-TETANUS-PERTUSSIS (TDAP) VACCINE: ICD-10-CM

## 2023-06-08 ENCOUNTER — PRENATAL OFFICE VISIT (OUTPATIENT)
Dept: OBGYN | Facility: CLINIC | Age: 33
End: 2023-06-08
Payer: COMMERCIAL

## 2023-06-08 ENCOUNTER — LAB (OUTPATIENT)
Dept: LAB | Facility: CLINIC | Age: 33
End: 2023-06-08
Payer: COMMERCIAL

## 2023-06-08 VITALS
WEIGHT: 140.8 LBS | SYSTOLIC BLOOD PRESSURE: 90 MMHG | BODY MASS INDEX: 26.58 KG/M2 | DIASTOLIC BLOOD PRESSURE: 52 MMHG | HEIGHT: 61 IN

## 2023-06-08 DIAGNOSIS — Z23 NEED FOR DIPHTHERIA-TETANUS-PERTUSSIS (TDAP) VACCINE: ICD-10-CM

## 2023-06-08 DIAGNOSIS — O09.92 SUPERVISION OF HIGH RISK PREGNANCY IN SECOND TRIMESTER: Primary | ICD-10-CM

## 2023-06-08 DIAGNOSIS — Z36.9 ENCOUNTER FOR ANTENATAL SCREENING OF MOTHER: ICD-10-CM

## 2023-06-08 LAB
ERYTHROCYTE [DISTWIDTH] IN BLOOD BY AUTOMATED COUNT: 13.4 % (ref 10–15)
GLUCOSE 1H P 50 G GLC PO SERPL-MCNC: 139 MG/DL (ref 70–129)
HCT VFR BLD AUTO: 37.1 % (ref 35–47)
HGB BLD-MCNC: 12.1 G/DL (ref 11.7–15.7)
MCH RBC QN AUTO: 31.3 PG (ref 26.5–33)
MCHC RBC AUTO-ENTMCNC: 32.6 G/DL (ref 31.5–36.5)
MCV RBC AUTO: 96 FL (ref 78–100)
PLATELET # BLD AUTO: 106 10E3/UL (ref 150–450)
RBC # BLD AUTO: 3.87 10E6/UL (ref 3.8–5.2)
WBC # BLD AUTO: 8.1 10E3/UL (ref 4–11)

## 2023-06-08 PROCEDURE — 82950 GLUCOSE TEST: CPT

## 2023-06-08 PROCEDURE — 90471 IMMUNIZATION ADMIN: CPT | Performed by: OBSTETRICS & GYNECOLOGY

## 2023-06-08 PROCEDURE — 90715 TDAP VACCINE 7 YRS/> IM: CPT | Performed by: OBSTETRICS & GYNECOLOGY

## 2023-06-08 PROCEDURE — 85027 COMPLETE CBC AUTOMATED: CPT

## 2023-06-08 PROCEDURE — 99207 PR PRENATAL VISIT: CPT | Performed by: OBSTETRICS & GYNECOLOGY

## 2023-06-08 PROCEDURE — 36415 COLL VENOUS BLD VENIPUNCTURE: CPT

## 2023-06-08 NOTE — PROGRESS NOTES
Prenatal Visit: Doing well. No concerns today besides waking up on her back. Having early morning wakings that prevents her from getting a full night's rest during the week. I recommend Doxylamine/Unisom. 25 mg at night. Accepts TDAP. Glucola today. CBC will be done today. Stable thrombocytopenia.  RTC in 2 weeks.  Amira Schmidt MD

## 2023-06-09 DIAGNOSIS — R73.02 IMPAIRED GLUCOSE TOLERANCE: Primary | ICD-10-CM

## 2023-06-12 ENCOUNTER — TELEPHONE (OUTPATIENT)
Dept: OBGYN | Facility: CLINIC | Age: 33
End: 2023-06-12

## 2023-06-12 ENCOUNTER — LAB (OUTPATIENT)
Dept: LAB | Facility: CLINIC | Age: 33
End: 2023-06-12
Payer: COMMERCIAL

## 2023-06-12 DIAGNOSIS — R73.02 IMPAIRED GLUCOSE TOLERANCE: ICD-10-CM

## 2023-06-12 LAB
GESTATIONAL GTT 1 HR POST DOSE: 156 MG/DL (ref 60–179)
GLUCOSE P FAST SERPL-MCNC: 82 MG/DL (ref 60–94)

## 2023-06-12 PROCEDURE — 36415 COLL VENOUS BLD VENIPUNCTURE: CPT

## 2023-06-12 PROCEDURE — 82950 GLUCOSE TEST: CPT

## 2023-06-12 PROCEDURE — 82947 ASSAY GLUCOSE BLOOD QUANT: CPT

## 2023-06-12 NOTE — TELEPHONE ENCOUNTER
28w3d  Pt here in clinic for thre hour glucose.  Pt reporting feeling very dizzy light-headed, nausea.  Patient very pale in color, unable for machine to read BP.  Patient given some water and able to rest her head down. Did start to feel a little better.  BP at that time 88/53    Consulted Dr Rodrigues, recommend stop testing at this time.  Point of care glucose 170.  Patient encouraged to drink some fluid. Patient feeling better with water and nirmal crackers.  is present with her and will drive her home.  Patient to rest for the next 1-2 hours and eat and drink normally the rest of the day.    MD to follow up with plan moving forward.  Pt verbalized understanding, in agreement with plan, and voiced no further questions.  Routing to provider to advise  David Bennett RN on 6/12/2023 at 10:29 AM'

## 2023-06-22 ENCOUNTER — LAB (OUTPATIENT)
Dept: LAB | Facility: CLINIC | Age: 33
End: 2023-06-22
Payer: COMMERCIAL

## 2023-06-22 ENCOUNTER — PRENATAL OFFICE VISIT (OUTPATIENT)
Dept: OBGYN | Facility: CLINIC | Age: 33
End: 2023-06-22
Payer: COMMERCIAL

## 2023-06-22 VITALS
SYSTOLIC BLOOD PRESSURE: 90 MMHG | DIASTOLIC BLOOD PRESSURE: 56 MMHG | BODY MASS INDEX: 27.38 KG/M2 | WEIGHT: 145 LBS | HEIGHT: 61 IN

## 2023-06-22 DIAGNOSIS — D69.6 THROMBOCYTOPENIA AFFECTING PREGNANCY, ANTEPARTUM (H): ICD-10-CM

## 2023-06-22 DIAGNOSIS — O09.92 SUPERVISION OF HIGH RISK PREGNANCY IN SECOND TRIMESTER: Primary | ICD-10-CM

## 2023-06-22 DIAGNOSIS — O99.119 THROMBOCYTOPENIA AFFECTING PREGNANCY, ANTEPARTUM (H): ICD-10-CM

## 2023-06-22 LAB
ERYTHROCYTE [DISTWIDTH] IN BLOOD BY AUTOMATED COUNT: 13.2 % (ref 10–15)
HCT VFR BLD AUTO: 36 % (ref 35–47)
HGB BLD-MCNC: 11.8 G/DL (ref 11.7–15.7)
MCH RBC QN AUTO: 31.6 PG (ref 26.5–33)
MCHC RBC AUTO-ENTMCNC: 32.8 G/DL (ref 31.5–36.5)
MCV RBC AUTO: 96 FL (ref 78–100)
PLAT MORPH BLD: NORMAL
PLATELET # BLD AUTO: 99 10E3/UL (ref 150–450)
RBC # BLD AUTO: 3.74 10E6/UL (ref 3.8–5.2)
RBC MORPH BLD: NORMAL
WBC # BLD AUTO: 8.2 10E3/UL (ref 4–11)

## 2023-06-22 PROCEDURE — 85027 COMPLETE CBC AUTOMATED: CPT

## 2023-06-22 PROCEDURE — 36415 COLL VENOUS BLD VENIPUNCTURE: CPT

## 2023-06-22 PROCEDURE — 99207 PR PRENATAL VISIT: CPT | Performed by: OBSTETRICS & GYNECOLOGY

## 2023-06-22 NOTE — PROGRESS NOTES
Prenatal Visit: near fainting with 3 hour glucose test. However due to the two initially normal results can defer a repeat test at this time. Having left hip and knee pain and other MSK concerns. Discussed seeking a Pediatrician and also working on getting a breast pump through insurance.  RTC in 2 weeks  Amira Schmidt MD

## 2023-07-06 ENCOUNTER — PRENATAL OFFICE VISIT (OUTPATIENT)
Dept: OBGYN | Facility: CLINIC | Age: 33
End: 2023-07-06
Payer: COMMERCIAL

## 2023-07-06 VITALS
BODY MASS INDEX: 27.68 KG/M2 | DIASTOLIC BLOOD PRESSURE: 50 MMHG | HEIGHT: 61 IN | SYSTOLIC BLOOD PRESSURE: 90 MMHG | WEIGHT: 146.6 LBS

## 2023-07-06 DIAGNOSIS — O09.92 SUPERVISION OF HIGH RISK PREGNANCY IN SECOND TRIMESTER: Primary | ICD-10-CM

## 2023-07-06 PROCEDURE — 99207 PR PRENATAL VISIT: CPT | Performed by: OBSTETRICS & GYNECOLOGY

## 2023-07-06 NOTE — PROGRESS NOTES
Prenatal Visit: still not sleeping well. Recommend Doxylamine 25 mg at night. Discussed breast pumps, Pediatricians, Postpartum sleep and transition and also discussed childcare.  Questions answered.   RTC in 2 weeks  Amira Schmidt MD

## 2023-07-10 ENCOUNTER — TELEPHONE (OUTPATIENT)
Dept: HEMATOLOGY | Facility: CLINIC | Age: 33
End: 2023-07-10
Payer: COMMERCIAL

## 2023-07-17 ENCOUNTER — LAB (OUTPATIENT)
Dept: LAB | Facility: CLINIC | Age: 33
End: 2023-07-17
Payer: COMMERCIAL

## 2023-07-17 ENCOUNTER — PRENATAL OFFICE VISIT (OUTPATIENT)
Dept: OBGYN | Facility: CLINIC | Age: 33
End: 2023-07-17
Payer: COMMERCIAL

## 2023-07-17 VITALS — SYSTOLIC BLOOD PRESSURE: 94 MMHG | BODY MASS INDEX: 27.96 KG/M2 | DIASTOLIC BLOOD PRESSURE: 60 MMHG | WEIGHT: 148 LBS

## 2023-07-17 DIAGNOSIS — O09.93 SUPERVISION OF HIGH RISK PREGNANCY IN THIRD TRIMESTER: Primary | ICD-10-CM

## 2023-07-17 DIAGNOSIS — D69.6 THROMBOCYTOPENIA AFFECTING PREGNANCY, ANTEPARTUM (H): ICD-10-CM

## 2023-07-17 DIAGNOSIS — O99.119 THROMBOCYTOPENIA AFFECTING PREGNANCY, ANTEPARTUM (H): ICD-10-CM

## 2023-07-17 LAB
ERYTHROCYTE [DISTWIDTH] IN BLOOD BY AUTOMATED COUNT: 13 % (ref 10–15)
HCT VFR BLD AUTO: 37 % (ref 35–47)
HGB BLD-MCNC: 12.1 G/DL (ref 11.7–15.7)
MCH RBC QN AUTO: 31.5 PG (ref 26.5–33)
MCHC RBC AUTO-ENTMCNC: 32.7 G/DL (ref 31.5–36.5)
MCV RBC AUTO: 96 FL (ref 78–100)
PLAT MORPH BLD: NORMAL
PLATELET # BLD AUTO: 92 10E3/UL (ref 150–450)
RBC # BLD AUTO: 3.84 10E6/UL (ref 3.8–5.2)
RBC MORPH BLD: NORMAL
WBC # BLD AUTO: 6.6 10E3/UL (ref 4–11)

## 2023-07-17 PROCEDURE — 85014 HEMATOCRIT: CPT

## 2023-07-17 PROCEDURE — 99207 PR PRENATAL VISIT: CPT | Performed by: OBSTETRICS & GYNECOLOGY

## 2023-07-17 PROCEDURE — 36415 COLL VENOUS BLD VENIPUNCTURE: CPT

## 2023-07-17 NOTE — PROGRESS NOTES
Prenatal Visit: reports fetal movement is changed from prior but still occurring often enough not be concerned. Denies contractions. Notes bilateral pedal edema. Discussed that delivery would be at 39 weeks at the latest.  RTC in 2 weeks  Amira Schmidt MD

## 2023-07-31 ENCOUNTER — LAB (OUTPATIENT)
Dept: LAB | Facility: CLINIC | Age: 33
End: 2023-07-31
Payer: COMMERCIAL

## 2023-07-31 ENCOUNTER — PRENATAL OFFICE VISIT (OUTPATIENT)
Dept: OBGYN | Facility: CLINIC | Age: 33
End: 2023-07-31
Payer: COMMERCIAL

## 2023-07-31 VITALS — SYSTOLIC BLOOD PRESSURE: 92 MMHG | BODY MASS INDEX: 28.53 KG/M2 | WEIGHT: 151 LBS | DIASTOLIC BLOOD PRESSURE: 58 MMHG

## 2023-07-31 DIAGNOSIS — O09.93 SUPERVISION OF HIGH RISK PREGNANCY IN THIRD TRIMESTER: ICD-10-CM

## 2023-07-31 DIAGNOSIS — O99.119 THROMBOCYTOPENIA AFFECTING PREGNANCY, ANTEPARTUM (H): ICD-10-CM

## 2023-07-31 DIAGNOSIS — D69.6 THROMBOCYTOPENIA AFFECTING PREGNANCY, ANTEPARTUM (H): ICD-10-CM

## 2023-07-31 DIAGNOSIS — Z36.85 ANTENATAL SCREENING FOR STREPTOCOCCUS B: Primary | ICD-10-CM

## 2023-07-31 LAB
ERYTHROCYTE [DISTWIDTH] IN BLOOD BY AUTOMATED COUNT: 13 % (ref 10–15)
HCT VFR BLD AUTO: 37.6 % (ref 35–47)
HGB BLD-MCNC: 12.3 G/DL (ref 11.7–15.7)
MCH RBC QN AUTO: 31.3 PG (ref 26.5–33)
MCHC RBC AUTO-ENTMCNC: 32.7 G/DL (ref 31.5–36.5)
MCV RBC AUTO: 96 FL (ref 78–100)
PLAT MORPH BLD: ABNORMAL
PLATELET # BLD AUTO: 91 10E3/UL (ref 150–450)
RBC # BLD AUTO: 3.93 10E6/UL (ref 3.8–5.2)
RBC MORPH BLD: ABNORMAL
WBC # BLD AUTO: 6.6 10E3/UL (ref 4–11)

## 2023-07-31 PROCEDURE — 85014 HEMATOCRIT: CPT

## 2023-07-31 PROCEDURE — 36415 COLL VENOUS BLD VENIPUNCTURE: CPT

## 2023-07-31 PROCEDURE — 99207 PR PRENATAL VISIT: CPT | Performed by: OBSTETRICS & GYNECOLOGY

## 2023-07-31 PROCEDURE — 87653 STREP B DNA AMP PROBE: CPT | Performed by: OBSTETRICS & GYNECOLOGY

## 2023-07-31 NOTE — PROGRESS NOTES
Prenatal Visit: Good fetal movement. Intermittent lower back pain and hip pain with positioning overnight. No contractions. Discussed delivery at about 8/25. Will schedule it the week before.  GBS done today. SVE deferred. Recommend wrist splints for carpal tunnel type of pain from soft tissue edema  Normal blood pressure today  RTC in one week  Amira Schmidt MD

## 2023-08-01 LAB — GP B STREP DNA SPEC QL NAA+PROBE: NEGATIVE

## 2023-08-04 ENCOUNTER — TELEPHONE (OUTPATIENT)
Dept: HEMATOLOGY | Facility: CLINIC | Age: 33
End: 2023-08-04
Payer: COMMERCIAL

## 2023-08-04 NOTE — TELEPHONE ENCOUNTER
[id number]  [facility]  32 year old female  [facility] Diagnosis: thrombocytopenia, DVT  [facility] Provider: Saima Gonzales PA-C    Incoming voicemail from [facility] requesting a call back to discuss overdue follow-up. RN will route to care team.    Yazmin Yan RN, BSN, PCCN  Nurse Clinician    [facility]  [address] [place] [address], [address], [place] [address]   Office, direct: [phone]  Main office number: [phone]  Pronouns: She, her, hers

## 2023-08-07 ENCOUNTER — PRENATAL OFFICE VISIT (OUTPATIENT)
Dept: OBGYN | Facility: CLINIC | Age: 33
End: 2023-08-07
Payer: COMMERCIAL

## 2023-08-07 VITALS — DIASTOLIC BLOOD PRESSURE: 84 MMHG | BODY MASS INDEX: 28.72 KG/M2 | WEIGHT: 152 LBS | SYSTOLIC BLOOD PRESSURE: 116 MMHG

## 2023-08-07 DIAGNOSIS — O09.93 SUPERVISION OF HIGH RISK PREGNANCY IN THIRD TRIMESTER: Primary | ICD-10-CM

## 2023-08-07 DIAGNOSIS — Z36.85 ANTENATAL SCREENING FOR STREPTOCOCCUS B: ICD-10-CM

## 2023-08-07 PROCEDURE — 99207 PR PRENATAL VISIT: CPT | Performed by: OBSTETRICS & GYNECOLOGY

## 2023-08-07 NOTE — PROGRESS NOTES
Prenatal Visit: doing well overall. Fetal movement is good overall. GBS negative from the last visit. Planning on IOL 8/25. Will decide timing of admission based on her exam earlier in the week.  Reviewed care recommendations from Hematologist/Oncologist. Will hold TXA due to history of DVT.  RTC in one week  Amira Schmidt MD

## 2023-08-14 ENCOUNTER — PRENATAL OFFICE VISIT (OUTPATIENT)
Dept: OBGYN | Facility: CLINIC | Age: 33
End: 2023-08-14
Payer: COMMERCIAL

## 2023-08-14 VITALS — WEIGHT: 154 LBS | DIASTOLIC BLOOD PRESSURE: 60 MMHG | BODY MASS INDEX: 29.1 KG/M2 | SYSTOLIC BLOOD PRESSURE: 98 MMHG

## 2023-08-14 DIAGNOSIS — O09.93 SUPERVISION OF HIGH RISK PREGNANCY IN THIRD TRIMESTER: Primary | ICD-10-CM

## 2023-08-14 DIAGNOSIS — D69.3 IDIOPATHIC THROMBOCYTOPENIC PURPURA (H): ICD-10-CM

## 2023-08-14 LAB
ERYTHROCYTE [DISTWIDTH] IN BLOOD BY AUTOMATED COUNT: 12.8 % (ref 10–15)
HCT VFR BLD AUTO: 39 % (ref 35–47)
HGB BLD-MCNC: 12.6 G/DL (ref 11.7–15.7)
MCH RBC QN AUTO: 31.3 PG (ref 26.5–33)
MCHC RBC AUTO-ENTMCNC: 32.3 G/DL (ref 31.5–36.5)
MCV RBC AUTO: 97 FL (ref 78–100)
PLATELET # BLD AUTO: 87 10E3/UL (ref 150–450)
RBC # BLD AUTO: 4.03 10E6/UL (ref 3.8–5.2)
WBC # BLD AUTO: 6.6 10E3/UL (ref 4–11)

## 2023-08-14 PROCEDURE — 99207 PR PRENATAL VISIT: CPT | Performed by: OBSTETRICS & GYNECOLOGY

## 2023-08-14 PROCEDURE — 85027 COMPLETE CBC AUTOMATED: CPT | Performed by: OBSTETRICS & GYNECOLOGY

## 2023-08-14 PROCEDURE — 36415 COLL VENOUS BLD VENIPUNCTURE: CPT | Performed by: OBSTETRICS & GYNECOLOGY

## 2023-08-14 NOTE — PROGRESS NOTES
Prenatal visit: doing well. Good fetal movement overall. Declines SVE today. Planning on it at the next visit to help with IOL scheduling. Labor precautions as well as ROM precautions given.  Repeat CBC today in anticipation of delivery next week. Plan will be to give Lovenox PP as long as Platelets are above 50K after delivery  Amira Schmidt MD

## 2023-08-17 NOTE — RESULT ENCOUNTER NOTE
Please inform of results --platelets slightly lower but stable.  Can follow up with Dr. Schmidt next week as scheduled to make delivery plan

## 2023-08-20 ENCOUNTER — NURSE TRIAGE (OUTPATIENT)
Dept: NURSING | Facility: CLINIC | Age: 33
End: 2023-08-20
Payer: COMMERCIAL

## 2023-08-20 NOTE — TELEPHONE ENCOUNTER
The patient is 38 weeks and ANGEL LUIS is 08/25/23. She is complaining of some red colored on her naval area and less then a inch inside the naval.  She denies any abdominal pain or discomfort  She denies any injury to the area  Blood colored spot noticed today on 08/20/23  Triage guidelines recommend to see HCP (or pcp triage) within 4 hours  Caller verbalized and understands directives  Nurse Triage SBAR    Is this a 2nd Level Triage? YES, LICENSED PRACTITIONER REVIEW IS REQUIRED    Situation:   The patient is 38 weeks and ANGEL LUIS is 08/25/23. She is complaining of some red colored on her naval area and less then a inch inside the naval.  She denies any abdominal pain or discomfort  She denies any injury to the area  Blood colored spot noticed today on 08/20/23    Background: The patient is 38 weeks pregnant and ANGEL LUIS is 08/25/23    Assessment: Per guidelines rule out bleeding disorder, petachiae    Protocol Recommended Disposition:   See HCP Within 4 Hours (Or PCP Triage)    Recommendation: Continue with care advice, wait for providers response     Paged to provider    Does the patient meet one of the following criteria for ADS visit consideration? 16+ years old, with an [facility] PCP     TIP  Providers, please consider if this condition is appropriate for management at one of our Acute and Diagnostic Services sites.     If patient is a good candidate, please use dotphrase <dot>triageresponse and select Refer to ADS to document.    Reason for Disposition   [1] Localized purple or blood-colored spots or dots AND [2] not from injury or friction AND [3] no fever    Additional Information   Negative: [1] Sudden onset of rash (within last 2 hours) AND [2] difficulty breathing or swallowing   Negative: Sounds like a life-threatening emergency to the triager   Negative: Athlete's Foot suspected (i.e., itchy rash between the toes)   Negative: Impetigo suspected (i.e., painless infected superficial small sores, less than 1 inch or 2.5 cm,  often covered by a soft, yellow-brown scab or crust; sometimes occurring near nasal openings)   Negative: Insect bite(s) suspected   Negative: Jock Itch suspected (i.e., itchy rash on inner thighs near genital area)   Negative: Localized lump (or swelling) without redness or rash   Negative: Poison ivy, oak, or sumac contact suspected   Negative: Rash of female genital area (vulva)   Negative: Rash of male genital area (penis or scrotum)   Negative: Redness of immunization site   Negative: Ringworm suspected (i.e., round pink patch, sometimes looks like ring, usually 1/2 to 1 inch [12-25 mm],  in size, slowly increasing in size)   Negative: Shingles suspected (i.e., painful rash, multiple small blisters grouped together in one area of body; dermatomal distribution)   Negative: Small spot, skin growth, or mole   Negative: Sores or skin ulcer, not a rash   Negative: Wound infection suspected (i.e., pain, spreading redness, or pus; in a cut, puncture, scrape or sutured wound)   Negative: [1] Localized purple or blood-colored spots or dots AND [2] not from injury or friction AND [3] fever   Negative: Patient sounds very sick or weak to the triager   Negative: [1] Red area or streak AND [2] fever   Negative: [1] Rash is painful to touch AND [2] fever   Negative: [1] Looks infected (spreading redness, pus) AND [2] large red area (> 2 in. or 5 cm)   Negative: [1] Looks infected (spreading redness, pus) AND [2] diabetes mellitus or weak immune system (e.g., HIV positive, cancer chemo, splenectomy, organ transplant, chronic steroids)    Protocols used: Rash or Redness - [id number]

## 2023-08-21 ENCOUNTER — ANESTHESIA (OUTPATIENT)
Dept: OBGYN | Facility: CLINIC | Age: 33
End: 2023-08-21
Payer: COMMERCIAL

## 2023-08-21 ENCOUNTER — ANESTHESIA EVENT (OUTPATIENT)
Dept: OBGYN | Facility: CLINIC | Age: 33
End: 2023-08-21
Payer: COMMERCIAL

## 2023-08-21 ENCOUNTER — HOSPITAL ENCOUNTER (INPATIENT)
Facility: CLINIC | Age: 33
LOS: 2 days | Discharge: HOME HEALTH | End: 2023-08-23
Attending: OBSTETRICS & GYNECOLOGY | Admitting: STUDENT IN AN ORGANIZED HEALTH CARE EDUCATION/TRAINING PROGRAM
Payer: COMMERCIAL

## 2023-08-21 ENCOUNTER — NURSE TRIAGE (OUTPATIENT)
Dept: NURSING | Facility: CLINIC | Age: 33
End: 2023-08-21

## 2023-08-21 LAB
ABO/RH(D): NORMAL
ANTIBODY SCREEN: NEGATIVE
ERYTHROCYTE [DISTWIDTH] IN BLOOD BY AUTOMATED COUNT: 12.5 % (ref 10–15)
HCT VFR BLD AUTO: 38.2 % (ref 35–47)
HGB BLD-MCNC: 12.8 G/DL (ref 11.7–15.7)
HOLD SPECIMEN: NORMAL
MCH RBC QN AUTO: 31.6 PG (ref 26.5–33)
MCHC RBC AUTO-ENTMCNC: 33.5 G/DL (ref 31.5–36.5)
MCV RBC AUTO: 94 FL (ref 78–100)
PLAT MORPH BLD: NORMAL
PLATELET # BLD AUTO: 89 10E3/UL (ref 150–450)
PLATELET # BLD AUTO: 98 10E3/UL (ref 150–450)
RBC # BLD AUTO: 4.05 10E6/UL (ref 3.8–5.2)
RBC MORPH BLD: NORMAL
SPECIMEN EXPIRATION DATE: NORMAL
T PALLIDUM AB SER QL: NONREACTIVE
WBC # BLD AUTO: 8.7 10E3/UL (ref 4–11)

## 2023-08-21 PROCEDURE — 59400 OBSTETRICAL CARE: CPT | Mod: 22 | Performed by: STUDENT IN AN ORGANIZED HEALTH CARE EDUCATION/TRAINING PROGRAM

## 2023-08-21 PROCEDURE — 85049 AUTOMATED PLATELET COUNT: CPT | Performed by: ANESTHESIOLOGY

## 2023-08-21 PROCEDURE — 85027 COMPLETE CBC AUTOMATED: CPT | Performed by: OBSTETRICS & GYNECOLOGY

## 2023-08-21 PROCEDURE — 258N000003 HC RX IP 258 OP 636: Performed by: ANESTHESIOLOGY

## 2023-08-21 PROCEDURE — 250N000011 HC RX IP 250 OP 636: Performed by: ANESTHESIOLOGY

## 2023-08-21 PROCEDURE — 250N000011 HC RX IP 250 OP 636: Mod: JZ | Performed by: ANESTHESIOLOGY

## 2023-08-21 PROCEDURE — 86901 BLOOD TYPING SEROLOGIC RH(D): CPT | Performed by: OBSTETRICS & GYNECOLOGY

## 2023-08-21 PROCEDURE — 370N000003 HC ANESTHESIA WARD SERVICE: Performed by: ANESTHESIOLOGY

## 2023-08-21 PROCEDURE — 722N000001 HC LABOR CARE VAGINAL DELIVERY SINGLE

## 2023-08-21 PROCEDURE — 120N000001 HC R&B MED SURG/OB

## 2023-08-21 PROCEDURE — 3E0R3BZ INTRODUCTION OF ANESTHETIC AGENT INTO SPINAL CANAL, PERCUTANEOUS APPROACH: ICD-10-PCS | Performed by: ANESTHESIOLOGY

## 2023-08-21 PROCEDURE — 36415 COLL VENOUS BLD VENIPUNCTURE: CPT | Performed by: OBSTETRICS & GYNECOLOGY

## 2023-08-21 PROCEDURE — 250N000011 HC RX IP 250 OP 636: Performed by: STUDENT IN AN ORGANIZED HEALTH CARE EDUCATION/TRAINING PROGRAM

## 2023-08-21 PROCEDURE — 0UQMXZZ REPAIR VULVA, EXTERNAL APPROACH: ICD-10-PCS | Performed by: STUDENT IN AN ORGANIZED HEALTH CARE EDUCATION/TRAINING PROGRAM

## 2023-08-21 PROCEDURE — 86850 RBC ANTIBODY SCREEN: CPT | Performed by: OBSTETRICS & GYNECOLOGY

## 2023-08-21 PROCEDURE — 250N000009 HC RX 250: Performed by: STUDENT IN AN ORGANIZED HEALTH CARE EDUCATION/TRAINING PROGRAM

## 2023-08-21 PROCEDURE — G0463 HOSPITAL OUTPT CLINIC VISIT: HCPCS

## 2023-08-21 PROCEDURE — 00HU33Z INSERTION OF INFUSION DEVICE INTO SPINAL CANAL, PERCUTANEOUS APPROACH: ICD-10-PCS | Performed by: ANESTHESIOLOGY

## 2023-08-21 PROCEDURE — 36415 COLL VENOUS BLD VENIPUNCTURE: CPT | Performed by: ANESTHESIOLOGY

## 2023-08-21 PROCEDURE — 86780 TREPONEMA PALLIDUM: CPT | Performed by: STUDENT IN AN ORGANIZED HEALTH CARE EDUCATION/TRAINING PROGRAM

## 2023-08-21 PROCEDURE — 0DQR0ZZ REPAIR ANAL SPHINCTER, OPEN APPROACH: ICD-10-PCS | Performed by: STUDENT IN AN ORGANIZED HEALTH CARE EDUCATION/TRAINING PROGRAM

## 2023-08-21 PROCEDURE — 10907ZC DRAINAGE OF AMNIOTIC FLUID, THERAPEUTIC FROM PRODUCTS OF CONCEPTION, VIA NATURAL OR ARTIFICIAL OPENING: ICD-10-PCS | Performed by: STUDENT IN AN ORGANIZED HEALTH CARE EDUCATION/TRAINING PROGRAM

## 2023-08-21 RX ORDER — IBUPROFEN 400 MG/1
800 TABLET, FILM COATED ORAL
Status: DISCONTINUED | OUTPATIENT
Start: 2023-08-21 | End: 2023-08-23 | Stop reason: HOSPADM

## 2023-08-21 RX ORDER — OXYTOCIN 10 [USP'U]/ML
10 INJECTION, SOLUTION INTRAMUSCULAR; INTRAVENOUS
Status: DISCONTINUED | OUTPATIENT
Start: 2023-08-21 | End: 2023-08-23 | Stop reason: HOSPADM

## 2023-08-21 RX ORDER — MISOPROSTOL 200 UG/1
400 TABLET ORAL
Status: DISCONTINUED | OUTPATIENT
Start: 2023-08-21 | End: 2023-08-21 | Stop reason: HOSPADM

## 2023-08-21 RX ORDER — NALBUPHINE HYDROCHLORIDE 20 MG/ML
2.5-5 INJECTION, SOLUTION INTRAMUSCULAR; INTRAVENOUS; SUBCUTANEOUS EVERY 6 HOURS PRN
Status: DISCONTINUED | OUTPATIENT
Start: 2023-08-21 | End: 2023-08-23 | Stop reason: HOSPADM

## 2023-08-21 RX ORDER — OXYTOCIN/0.9 % SODIUM CHLORIDE 30/500 ML
100-340 PLASTIC BAG, INJECTION (ML) INTRAVENOUS CONTINUOUS PRN
Status: DISCONTINUED | OUTPATIENT
Start: 2023-08-21 | End: 2023-08-23 | Stop reason: HOSPADM

## 2023-08-21 RX ORDER — PROCHLORPERAZINE 25 MG
25 SUPPOSITORY, RECTAL RECTAL EVERY 12 HOURS PRN
Status: DISCONTINUED | OUTPATIENT
Start: 2023-08-21 | End: 2023-08-21 | Stop reason: HOSPADM

## 2023-08-21 RX ORDER — NALOXONE HYDROCHLORIDE 0.4 MG/ML
0.4 INJECTION, SOLUTION INTRAMUSCULAR; INTRAVENOUS; SUBCUTANEOUS
Status: DISCONTINUED | OUTPATIENT
Start: 2023-08-21 | End: 2023-08-21 | Stop reason: HOSPADM

## 2023-08-21 RX ORDER — ONDANSETRON 2 MG/ML
4 INJECTION INTRAMUSCULAR; INTRAVENOUS EVERY 6 HOURS PRN
Status: DISCONTINUED | OUTPATIENT
Start: 2023-08-21 | End: 2023-08-21 | Stop reason: HOSPADM

## 2023-08-21 RX ORDER — METOCLOPRAMIDE HYDROCHLORIDE 5 MG/ML
10 INJECTION INTRAMUSCULAR; INTRAVENOUS EVERY 6 HOURS PRN
Status: DISCONTINUED | OUTPATIENT
Start: 2023-08-21 | End: 2023-08-21 | Stop reason: HOSPADM

## 2023-08-21 RX ORDER — TRANEXAMIC ACID 10 MG/ML
1 INJECTION, SOLUTION INTRAVENOUS EVERY 30 MIN PRN
Status: DISCONTINUED | OUTPATIENT
Start: 2023-08-21 | End: 2023-08-21 | Stop reason: HOSPADM

## 2023-08-21 RX ORDER — ROPIVACAINE HYDROCHLORIDE 2 MG/ML
INJECTION, SOLUTION EPIDURAL; INFILTRATION; PERINEURAL
Status: COMPLETED | OUTPATIENT
Start: 2023-08-21 | End: 2023-08-21

## 2023-08-21 RX ORDER — MODIFIED LANOLIN
OINTMENT (GRAM) TOPICAL
Status: DISCONTINUED | OUTPATIENT
Start: 2023-08-21 | End: 2023-08-23 | Stop reason: HOSPADM

## 2023-08-21 RX ORDER — TRANEXAMIC ACID 10 MG/ML
1 INJECTION, SOLUTION INTRAVENOUS EVERY 30 MIN PRN
Status: DISCONTINUED | OUTPATIENT
Start: 2023-08-21 | End: 2023-08-23 | Stop reason: HOSPADM

## 2023-08-21 RX ORDER — EPHEDRINE SULFATE 50 MG/ML
5 INJECTION, SOLUTION INTRAMUSCULAR; INTRAVENOUS; SUBCUTANEOUS
Status: DISCONTINUED | OUTPATIENT
Start: 2023-08-21 | End: 2023-08-21 | Stop reason: HOSPADM

## 2023-08-21 RX ORDER — FENTANYL CITRATE 50 UG/ML
100 INJECTION, SOLUTION INTRAMUSCULAR; INTRAVENOUS ONCE
Status: DISCONTINUED | OUTPATIENT
Start: 2023-08-21 | End: 2023-08-21 | Stop reason: HOSPADM

## 2023-08-21 RX ORDER — PROCHLORPERAZINE MALEATE 5 MG
10 TABLET ORAL EVERY 6 HOURS PRN
Status: DISCONTINUED | OUTPATIENT
Start: 2023-08-21 | End: 2023-08-21 | Stop reason: HOSPADM

## 2023-08-21 RX ORDER — METOCLOPRAMIDE 10 MG/1
10 TABLET ORAL EVERY 6 HOURS PRN
Status: DISCONTINUED | OUTPATIENT
Start: 2023-08-21 | End: 2023-08-21 | Stop reason: HOSPADM

## 2023-08-21 RX ORDER — KETOROLAC TROMETHAMINE 30 MG/ML
30 INJECTION, SOLUTION INTRAMUSCULAR; INTRAVENOUS
Status: DISCONTINUED | OUTPATIENT
Start: 2023-08-21 | End: 2023-08-23 | Stop reason: HOSPADM

## 2023-08-21 RX ORDER — MISOPROSTOL 200 UG/1
800 TABLET ORAL
Status: DISCONTINUED | OUTPATIENT
Start: 2023-08-21 | End: 2023-08-23 | Stop reason: HOSPADM

## 2023-08-21 RX ORDER — SODIUM CHLORIDE, SODIUM LACTATE, POTASSIUM CHLORIDE, CALCIUM CHLORIDE 600; 310; 30; 20 MG/100ML; MG/100ML; MG/100ML; MG/100ML
INJECTION, SOLUTION INTRAVENOUS CONTINUOUS PRN
Status: DISCONTINUED | OUTPATIENT
Start: 2023-08-21 | End: 2023-08-21 | Stop reason: HOSPADM

## 2023-08-21 RX ORDER — ACETAMINOPHEN 325 MG/1
650 TABLET ORAL EVERY 4 HOURS PRN
Status: DISCONTINUED | OUTPATIENT
Start: 2023-08-21 | End: 2023-08-23 | Stop reason: HOSPADM

## 2023-08-21 RX ORDER — DEXTROSE, SODIUM CHLORIDE, SODIUM LACTATE, POTASSIUM CHLORIDE, AND CALCIUM CHLORIDE 5; .6; .31; .03; .02 G/100ML; G/100ML; G/100ML; G/100ML; G/100ML
500 INJECTION, SOLUTION INTRAVENOUS ONCE
Status: COMPLETED | OUTPATIENT
Start: 2023-08-21 | End: 2023-08-21

## 2023-08-21 RX ORDER — HYDROCORTISONE 25 MG/G
CREAM TOPICAL 3 TIMES DAILY PRN
Status: DISCONTINUED | OUTPATIENT
Start: 2023-08-21 | End: 2023-08-23 | Stop reason: HOSPADM

## 2023-08-21 RX ORDER — OXYTOCIN/0.9 % SODIUM CHLORIDE 30/500 ML
1-24 PLASTIC BAG, INJECTION (ML) INTRAVENOUS CONTINUOUS
Status: DISCONTINUED | OUTPATIENT
Start: 2023-08-21 | End: 2023-08-21 | Stop reason: HOSPADM

## 2023-08-21 RX ORDER — CARBOPROST TROMETHAMINE 250 UG/ML
250 INJECTION, SOLUTION INTRAMUSCULAR
Status: DISCONTINUED | OUTPATIENT
Start: 2023-08-21 | End: 2023-08-23 | Stop reason: HOSPADM

## 2023-08-21 RX ORDER — NALOXONE HYDROCHLORIDE 0.4 MG/ML
0.2 INJECTION, SOLUTION INTRAMUSCULAR; INTRAVENOUS; SUBCUTANEOUS
Status: DISCONTINUED | OUTPATIENT
Start: 2023-08-21 | End: 2023-08-21 | Stop reason: HOSPADM

## 2023-08-21 RX ORDER — LIDOCAINE 40 MG/G
CREAM TOPICAL
Status: DISCONTINUED | OUTPATIENT
Start: 2023-08-21 | End: 2023-08-21 | Stop reason: HOSPADM

## 2023-08-21 RX ORDER — SODIUM CHLORIDE, SODIUM LACTATE, POTASSIUM CHLORIDE, CALCIUM CHLORIDE 600; 310; 30; 20 MG/100ML; MG/100ML; MG/100ML; MG/100ML
INJECTION, SOLUTION INTRAVENOUS CONTINUOUS
Status: DISCONTINUED | OUTPATIENT
Start: 2023-08-21 | End: 2023-08-21 | Stop reason: HOSPADM

## 2023-08-21 RX ORDER — FENTANYL CITRATE 50 UG/ML
INJECTION, SOLUTION INTRAMUSCULAR; INTRAVENOUS
Status: COMPLETED | OUTPATIENT
Start: 2023-08-21 | End: 2023-08-21

## 2023-08-21 RX ORDER — METHYLERGONOVINE MALEATE 0.2 MG/ML
200 INJECTION INTRAVENOUS
Status: DISCONTINUED | OUTPATIENT
Start: 2023-08-21 | End: 2023-08-21 | Stop reason: HOSPADM

## 2023-08-21 RX ORDER — METHYLERGONOVINE MALEATE 0.2 MG/ML
200 INJECTION INTRAVENOUS
Status: DISCONTINUED | OUTPATIENT
Start: 2023-08-21 | End: 2023-08-23 | Stop reason: HOSPADM

## 2023-08-21 RX ORDER — LIDOCAINE HYDROCHLORIDE AND EPINEPHRINE 15; 5 MG/ML; UG/ML
3 INJECTION, SOLUTION EPIDURAL
Status: DISCONTINUED | OUTPATIENT
Start: 2023-08-21 | End: 2023-08-21 | Stop reason: HOSPADM

## 2023-08-21 RX ORDER — IBUPROFEN 400 MG/1
800 TABLET, FILM COATED ORAL EVERY 6 HOURS PRN
Status: DISCONTINUED | OUTPATIENT
Start: 2023-08-21 | End: 2023-08-23 | Stop reason: HOSPADM

## 2023-08-21 RX ORDER — ONDANSETRON 4 MG/1
4 TABLET, ORALLY DISINTEGRATING ORAL EVERY 6 HOURS PRN
Status: DISCONTINUED | OUTPATIENT
Start: 2023-08-21 | End: 2023-08-21 | Stop reason: HOSPADM

## 2023-08-21 RX ORDER — CARBOPROST TROMETHAMINE 250 UG/ML
250 INJECTION, SOLUTION INTRAMUSCULAR
Status: DISCONTINUED | OUTPATIENT
Start: 2023-08-21 | End: 2023-08-21 | Stop reason: HOSPADM

## 2023-08-21 RX ORDER — ROPIVACAINE HYDROCHLORIDE 2 MG/ML
10 INJECTION, SOLUTION EPIDURAL; INFILTRATION; PERINEURAL ONCE
Status: DISCONTINUED | OUTPATIENT
Start: 2023-08-21 | End: 2023-08-21 | Stop reason: HOSPADM

## 2023-08-21 RX ORDER — OXYTOCIN/0.9 % SODIUM CHLORIDE 30/500 ML
340 PLASTIC BAG, INJECTION (ML) INTRAVENOUS CONTINUOUS PRN
Status: DISCONTINUED | OUTPATIENT
Start: 2023-08-21 | End: 2023-08-21 | Stop reason: HOSPADM

## 2023-08-21 RX ORDER — DOCUSATE SODIUM 100 MG/1
100 CAPSULE, LIQUID FILLED ORAL 2 TIMES DAILY
Status: DISCONTINUED | OUTPATIENT
Start: 2023-08-21 | End: 2023-08-23 | Stop reason: HOSPADM

## 2023-08-21 RX ORDER — CITRIC ACID/SODIUM CITRATE 334-500MG
30 SOLUTION, ORAL ORAL
Status: DISCONTINUED | OUTPATIENT
Start: 2023-08-21 | End: 2023-08-21 | Stop reason: HOSPADM

## 2023-08-21 RX ORDER — OXYTOCIN 10 [USP'U]/ML
10 INJECTION, SOLUTION INTRAMUSCULAR; INTRAVENOUS
Status: DISCONTINUED | OUTPATIENT
Start: 2023-08-21 | End: 2023-08-21 | Stop reason: HOSPADM

## 2023-08-21 RX ORDER — MISOPROSTOL 200 UG/1
400 TABLET ORAL
Status: DISCONTINUED | OUTPATIENT
Start: 2023-08-21 | End: 2023-08-23 | Stop reason: HOSPADM

## 2023-08-21 RX ORDER — OXYTOCIN/0.9 % SODIUM CHLORIDE 30/500 ML
340 PLASTIC BAG, INJECTION (ML) INTRAVENOUS CONTINUOUS PRN
Status: DISCONTINUED | OUTPATIENT
Start: 2023-08-21 | End: 2023-08-23 | Stop reason: HOSPADM

## 2023-08-21 RX ORDER — MISOPROSTOL 200 UG/1
800 TABLET ORAL
Status: DISCONTINUED | OUTPATIENT
Start: 2023-08-21 | End: 2023-08-21 | Stop reason: HOSPADM

## 2023-08-21 RX ADMIN — FENTANYL CITRATE 100 MCG: 50 INJECTION, SOLUTION INTRAMUSCULAR; INTRAVENOUS at 11:15

## 2023-08-21 RX ADMIN — METHYLERGONOVINE MALEATE 200 MCG: 0.2 INJECTION INTRAVENOUS at 22:10

## 2023-08-21 RX ADMIN — SODIUM CHLORIDE, SODIUM LACTATE, POTASSIUM CHLORIDE, CALCIUM CHLORIDE AND DEXTROSE MONOHYDRATE 500 ML: 5; 600; 310; 30; 20 INJECTION, SOLUTION INTRAVENOUS at 14:25

## 2023-08-21 RX ADMIN — SODIUM CHLORIDE, POTASSIUM CHLORIDE, SODIUM LACTATE AND CALCIUM CHLORIDE 1000 ML: 600; 310; 30; 20 INJECTION, SOLUTION INTRAVENOUS at 10:15

## 2023-08-21 RX ADMIN — Medication: at 19:29

## 2023-08-21 RX ADMIN — Medication 2 MILLI-UNITS/MIN: at 19:34

## 2023-08-21 RX ADMIN — ROPIVACAINE HYDROCHLORIDE 8 ML: 2 INJECTION, SOLUTION EPIDURAL; INFILTRATION at 11:15

## 2023-08-21 RX ADMIN — Medication: at 11:18

## 2023-08-21 ASSESSMENT — ACTIVITIES OF DAILY LIVING (ADL)
ADLS_ACUITY_SCORE: 18

## 2023-08-21 NOTE — PROGRESS NOTES
Pt arrived to [facility] on previous shift for rule out labor. EUM/US monitors in place. VE done by previous RN 1cm posterior. Pt having mild uterine contractions every 2-3 minutes. Pt states she had a previous DVT in 2021 in left leg. Discussed plan of care with Dr. Garcia. Labs ordered. Lab at beside at this time,

## 2023-08-21 NOTE — H&P
[facility]  OB History and Physical      Marie Ball MRN# [id number]   Age: 32 year old YOB: 1990     CC:  Contractions    HPI:  Ms. Marie Ball is a 32 year old  at 38w3d, who presents with contractions since about 10pm.  She denies vaginal bleeding, and loss of fluid.   + normal fetal movement.  Denies symptoms of HA, vision changes, SOB, chest pain, fevers, chills, palpitations, nausea, vomiting, RUQ pain, dysuria, constipation, diarrhea.     Conditions affecting pregnancy  - Hx of provoked DVT, not anticoagulated  - ITP       Prenatal Labs:   Lab Results   Component Value Date    AS Negative 2023    HEPBANG Nonreactive 2023    HGB 12.8 2023       GBS Status: negative      OB History  OB History    Para Term  AB Living   1 0 0 0 0 0   SAB IAB Ectopic Multiple Live Births   0 0 0 0 0      # Outcome Date GA Lbr Justin/2nd Weight Sex Delivery Anes PTL Lv   1 Current                PMHx:   Past Medical History:   Diagnosis Date    DVT (deep venous thrombosis) (H) 10/2021    Previous ankle fx (cast)/OCP    Endometriosis      PSHx:   Past Surgical History:   Procedure Laterality Date    NO HISTORY OF SURGERY       Meds:   Medications Prior to Admission   Medication Sig Dispense Refill Last Dose    doxylamine (UNISOM) 25 MG TABS tablet Take 25 mg by mouth At Bedtime   2023    Ferrous Gluconate (IRON) 240 (27 Fe) MG TABS    2023    Prenatal Vit-Fe Sulfate-FA-DHA (PRENATAL VITAMIN/MIN +DHA PO)    2023    enoxaparin ANTICOAGULANT (LOVENOX) 40 MG/0.4ML syringe Inject 0.4 mLs (40 mg) Subcutaneous daily (Patient not taking: Reported on 2023) 12 mL 11      Allergies:  No Known Allergies   FmHx:   Family History   Problem Relation Age of Onset    Hypertension Mother     Hypertension Maternal Grandmother     Throat cancer Paternal Grandmother     Throat cancer Paternal Aunt        PE:  Vit: Patient Vitals for the past 4 hrs:   BP Temp Temp  src Resp SpO2   23 1146 104/63 97.8  F (36.6  C) Temporal 16 92 %   23 1130 105/61 -- -- -- 97 %      Gen: Well-appearing, NAD, comfortable   CV: Regular rate  Pulm: Breathing comfortably on RA  Abd: Soft, gravid, non-tender,  Ext: trace LE edema b/l  Cx: /-3         FHT: Baseline 150bpm, moderate variability, +accelerations , no decelerations   [place]: 2-3 contractions in 10 minutes          Assessment  Ms. Marie Ball is a 32 year old , at 38w3d who presents in early labor.    Plan  - Labor   - Admit to L&D for labor.    - PNC:     - Rh positiv. Rubella immune. GBS neg. No intervention indicated.    - Fen/GI: Clear liquid diet, IVF   - SVE: /-3   - Membranes: AROM at 1245, clear fluid   - Plan: Expectant management. Augmentaiton with pitocin prn.    - Pain management: Epidural in place    -ITP: plt stable, 98. Continue to monitor. Discussed risk of hemorrahge. Will have all meds available except TXA due to hx DVT   - No VAVD    - Hx DVT: plan postpartum lovenox pending bleeding, plt.     -Fetal Well Being   - Category I FHT. Reactive and reassuring   - Continue EFM and [place]   - Anticipate     Iris Mcgraw MD, [facility]  OB/GYN  2023

## 2023-08-21 NOTE — ANESTHESIA PREPROCEDURE EVALUATION
Anesthesia Pre-Procedure Evaluation    Patient: Marie Ball   MRN: [id number] : 1990        Procedure :           Past Medical History:   Diagnosis Date    DVT (deep venous thrombosis) (H) 10/2021    Previous ankle fx (cast)/OCP    Endometriosis       Past Surgical History:   Procedure Laterality Date    NO HISTORY OF SURGERY        No Known Allergies   Social History     Tobacco Use    Smoking status: Never    Smokeless tobacco: Never   Substance Use Topics    Alcohol use: Not Currently      Wt Readings from Last 1 Encounters:   23 69.9 kg (154 lb)        Anesthesia Evaluation            ROS/MED HX  ENT/Pulmonary:    (-) asthma   Neurologic:  - neg neurologic ROS     Cardiovascular:    (-) PIH   METS/Exercise Tolerance:     Hematologic: Comments: PLT count immediately prior to epidural placement. 98k    (+)  no anticoagulation therapy, no coagulopathy, thrombocytopenia,            Musculoskeletal:       GI/Hepatic:     (+) GERD,                   Renal/Genitourinary:       Endo:       Psychiatric/Substance Use:       Infectious Disease:       Malignancy:       Other:            Physical Exam    Airway        Mallampati: II   TM distance: > 3 FB   Neck ROM: full     Respiratory Devices and Support         Dental  no notable dental history         Cardiovascular   cardiovascular exam normal          Pulmonary   pulmonary exam normal                OUTSIDE LABS:  CBC:   Lab Results   Component Value Date    WBC 8.7 2023    WBC 6.6 2023    HGB 12.8 2023    HGB 12.6 2023    HCT 38.2 2023    HCT 39.0 2023    PLT 98 (L) 2023    PLT 89 (L) 2023     BMP: No results found for: NA, POTASSIUM, CHLORIDE, CO2, BUN, CR, GLC  COAGS:   Lab Results   Component Value Date    INR 1.01 2023     POC: No results found for: BGM, HCG, HCGS  HEPATIC: No results found for: ALBUMIN, PROTTOTAL, ALT, AST, GGT, ALKPHOS, BILITOTAL, BILIDIRECT, LELAND  OTHER: No results found  for: PH, LACT, A1C, CATHERINE, PHOS, MAG, LIPASE, AMYLASE, TSH, T4, T3, CRP, SED    Anesthesia Plan    ASA Status:  2       Anesthesia Type: Epidural.              Consents    Anesthesia Plan(s) and associated risks, benefits, and realistic alternatives discussed. Questions answered and patient/representative(s) expressed understanding.     - Discussed:     - Discussed with:  Patient            Postoperative Care            Comments:    Other Comments: Orders to manage the epidural infusion have been entered, and through coordination with the nurse, we will continute to manage and monitor the patient's labor epidural.  We will continuously be available to adjust as needed thruout the entire L&D process.             Cliff Harvey, DO, DO

## 2023-08-21 NOTE — ANESTHESIA PROCEDURE NOTES
"Epidural catheter Procedure Note    Pre-Procedure   Staff -        Anesthesiologist:  Cliff Harvey DO       Performed By: anesthesiologist       Location: OB       Pre-Anesthestic Checklist: patient identified, IV checked, risks and benefits discussed, informed consent, monitors and equipment checked, pre-op evaluation and at physician/surgeon's request  Timeout:       Correct Patient: Yes        Correct Procedure: Yes        Correct Site: Yes        Correct Position: Yes   Procedure Documentation  Procedure: epidural catheter       Patient Position: sitting       Patient Prep/Sterile Barriers: sterile gloves, mask, patient draped       Skin prep: Betadine       Local skin infiltrated with 3 mL of 1% lidocaine.        Insertion Site: L4-5. (midline approach).       Technique: LORT saline        ANAHI at 4 cm.       Needle Type: Conyacy needle       Needle Gauge: 17.        Needle Length (Inches): 3.5        Catheter: 19 G.          Catheter threaded easily.         4 cm epidural space.         Threaded 8 cm at skin.         # of attempts: 1 and  # of redirects:  1    Assessment/Narrative         Paresthesias: No.       Test dose of 5 mL lidocaine 1.5% w/ 1:200,000 epinephrine at.         Test dose negative, 3 minutes after injection, for signs of intravascular, subdural, or intrathecal injection.       Insertion/Infusion Method: LORT saline       Aspiration negative for Heme or CSF via Epidural Catheter.    Medication(s) Administered   0.2% Ropivacaine (Epidural) - EPIDURAL   8 mL - 8/21/2023 11:15:00 AM  Fentanyl PF (Epidural) - EPIDURAL   100 mcg - 8/21/2023 11:15:00 AM    FOR [facility] ([facility]) ONLY:   Pain Team Contact information: please page the Pain Team Via [employer]. Search \"Pain\". During daytime hours, please page the attending first. At night please page the resident first.      "

## 2023-08-21 NOTE — DISCHARGE INSTRUCTIONS
Enoxaparin (Lovenox): Care Instructions  Overview        Enoxaparin (Lovenox) is an anticoagulant medicine. It is one of a class of anticoagulants called low molecular weight heparin. Many people call these medicines blood thinners. They don't actually thin the blood, but they increase the time it takes a blood clot to form. This reduces the chance of a blood clot in the leg veins (deep vein thrombosis) or in the lungs (pulmonary embolism).  Enoxaparin is a shot (injection). You or someone caring for you will inject it once or twice a day. Most people need shots for 5 to 10 days, but in some cases it can be longer. Your doctor will tell you how long you need to have the shots.  Enoxaparin is used to:  Treat deep vein thrombosis (DVT), which is a blood clot in the legs, pelvis, or arms.  Reduce the chance of getting blood clots after certain surgeries. For example, you may take enoxaparin after knee or hip replacement surgery.  Reduce the chance of getting blood clots in people who are likely to get them and who are not active for a long period of time. For example, you may need enoxaparin if you need to stay in bed for a long time because of a health problem.  Reduce the chance of blood clots when another blood thinner is stopped for a short time. For example, if you take warfarin and need surgery, your doctor may ask you to stop taking warfarin for a short time before the surgery. If you have a high risk of blood clots during this time, you may take enoxaparin before the surgery. After the surgery, your doctor will tell you when it is safe to start taking warfarin again. This is called bridge therapy.  Follow-up care is a key part of your treatment and safety. Be sure to make and go to all appointments, and call your doctor if you are having problems. It's also a good idea to know your test results and keep a list of the medicines you take.  How do you safely inject an anticoagulant (blood thinner)?  Follow your  doctor's instructions for how often to inject the medicine. Depending on what your doctor prescribed, you may give yourself a shot once or twice a day using prefilled syringes. Prefilled means that the syringes already have the medicine in them. Inject the medicine at the same time every day unless your doctor gives you other instructions.  Giving the shot  Gather your prefilled syringe and an alcohol wipe or a cotton ball dipped in alcohol.  Wash and dry your hands.  Sit or lie in a position that lets you see your belly.  Choose a site on the right or left side of your belly, at least 2 inches from your belly button.  Clean the injection site with the alcohol wipe or cotton ball. Let it dry.  Remove the cap from the needle.  Hold the syringe like a pencil in one hand, keeping your fingers off the plunger. You may see an air bubble. It's okay. Unless your doctor tells you to, you don't have to remove the bubble.  With your other hand, slightly pinch a fold of skin at the injection site between your fingers and thumb.  Hold the syringe at a 90-degree angle to your skin so the needle is pointing straight at the injection site.  Quickly push the needle all the way into the pinched-up fold of skin. Then push the plunger all the way in, so that the medicine empties out of the syringe. As you're giving the shot, keep holding the fold of skin so that you don't inject the medicine into muscle.  Pull the needle straight out and let go of the skin.  Point the needle away from you. Follow the  instructions for safely disposing of the needle and syringe. Don't use the same needle more than one time. Throw away the needle and the syringe in a safe place, such as a special container for needles.  If you bleed a little, apply pressure over the shot area with your finger, a cotton ball, or a piece of gauze. To help avoid bruising, do not rub the area.  Slightly change the spot where you give the shot each time you do  it.  Being safe  Call your doctor if you think you are having a problem with your medicine.  Don't stop taking your medicine without talking to your doctor.  If you miss a dose, call your doctor. Your doctor can tell you exactly what to do so you do not take too much or too little blood thinner. Then you will be as safe as possible.  Check with your doctor or pharmacist before you use any other medicines, including prescription and over-the-counter medicines. Make sure your doctor knows all of the medicines, vitamins, herbal products, and supplements you take. Taking some medicines together can cause problems.  Try to avoid injuries to help prevent bleeding. For example, be careful when you are exercising.  Store your medicine at room temperature. Don't put it in the refrigerator or freezer.  When should you call for help?   Call 911 anytime you think you may need emergency care. For example, call if:    You passed out (lost consciousness).     You have signs of severe bleeding, such as:  A severe headache that is different from past headaches.  Vomiting blood or what looks like coffee grounds.  Passing maroon or very bloody stools.   Call your doctor now or seek immediate medical care if:    You have unexpected bleeding, including:  Blood in stools or black stools that look like tar.  Blood in your urine.  Bruises or blood spots under the skin.     You feel dizzy or lightheaded.   Watch closely for changes in your health, and be sure to contact your doctor if:    You do not get better as expected.   Current as of: September 7, 2022               Content Version: 13.7    2444-5509 [employer].   Care instructions adapted under license by your healthcare professional. If you have questions about a medical condition or this instruction, always ask your healthcare professional. [employer] disclaims any warranty or liability for your use of this information.  Postpartum Vaginal Delivery  Instructions    Activity     Ask family and friends for help when you need it.  Do not place anything in your vagina for 6 weeks.  You are not restricted on other activities, but take it easy for a few weeks to allow your body to recover from delivery.  You are able to do any activities you feel up to that point.  No driving until you have stopped taking your pain medications (usually two weeks after delivery).     Call your health care provider if you have any of these symptoms:     Increased pain, swelling, redness, or fluid around your stiches from an episiotomy or perineal tear.  A fever above 100.4 F (38 C) with or without chills when placing a thermometer under your tongue.  You soak a sanitary pad with blood within 1 hour, or you see blood clots larger than a golf ball.  Bleeding that lasts more than 6 weeks.  Vaginal discharge that smells bad.  Severe pain, cramping or tenderness in your lower belly area.  A need to urinate more frequently (use the toilet more often), more urgently (use the toilet very quickly), or it burns when you urinate.  Nausea and vomiting.  Redness, swelling or pain around a vein in your leg.  Problems breastfeeding or a red or painful area on your breast.  Chest pain and cough or are gasping for air.  Problems coping with sadness, anxiety, or depression.  If you have any concerns about hurting yourself or the baby, call your provider immediately.   You have questions or concerns after you return home.     Keep your hands clean:  Always wash your hands before touching your perineal area and stitches.  This helps reduce your risk of infection.  If your hands aren't dirty, you may use an alcohol hand-rub to clean your hands. Keep your nails clean and short.

## 2023-08-21 NOTE — PROGRESS NOTES
VE at 0920 3cm 90% -1. Dr. Mcgraw updated. Verbal admission orders received. Pt ambulated to room 214 with spouse at side. Report given to HIRAL Yu.

## 2023-08-21 NOTE — TELEPHONE ENCOUNTER
She's having contractions. Wonder if she's in labor. 38 wk, 3 days. 2-3 minutes apart, a few minutes apart. On  going for two hours.    OB Triage Call      Is patient's OB/Midwife with the [facility]? [facility]- Proceed with triage     Reason for call: labor    Assessment: labor    Plan: labor and delivery    Patient plans to deliver at [facility]    Patient's primary OB Provider is Dr Petrona Schmidt    Per protocol recommendations Patient to be evaluated in L&D. Patient's primary OB is [facility].  Labor and delivery at [facility] ([phone]) notified of patient's pending arrival.  Report given to  who went to find a RN. The call disconnected twice, while waiting and calling back. I told the patient and her  to go ahead to L & D at [facility]. I tried calling L & D back a third time and no answer.    After 7 tries, Adriana took report..      Is patient's delivering hospital on divert? No      38w3d    Estimated Date of Delivery: Sep 1, 2023        OB History    Para Term  AB Living   1 0 0 0 0 0   SAB IAB Ectopic Multiple Live Births   0 0 0 0 0      # Outcome Date GA Lbr Justin/2nd Weight Sex Delivery Anes PTL Lv   1 Current                No results found for: GBS       Mariana Martinez RN 23 5:27 AM  [facility] Nurse Advisor  Reason for Disposition   [1] First baby (primipara) AND [2] contractions < 6 minutes apart  AND [3] present 2 hours    Additional Information   Negative: Passed out (i.e., lost consciousness, collapsed and was not responding)   Negative: Shock suspected (e.g., cold/pale/clammy skin, too weak to stand, low BP, rapid pulse)   Negative: Difficult to awaken or acting confused (e.g., disoriented, slurred speech)   Negative: [1] SEVERE abdominal pain (e.g., excruciating) AND [2] constant AND [3] present > 1 hour   Negative: Severe bleeding (e.g., continuous red blood from vagina, or large blood clots)   Negative: Umbilical cord hanging out  "of the vagina (shiny, white, curled appearance, \"like telephone cord\")   Negative: Can see baby   Negative: Uncontrollable urge to push (i.e., feels like baby is coming out now)   Negative: Sounds like a life-threatening emergency to the triager   Negative: Pregnant < 37 weeks (i.e., )   Negative: [1] Uncertain delivery date AND [2] possibly pregnant < 37 weeks (i.e., )    Protocols used: Pregnancy - Labor-A-    "

## 2023-08-21 NOTE — PLAN OF CARE
1100- Dr. Harvey, CRESCENCIO @bedside to place epidural. Epidural meds (ropivicaine, fentanyl) removed from Pyxis by KATHRINE Yu RN and handed off to CRESCENCIO to administer, document, and dispose of/waste. Anesthesia L&D Procedure Narcotic Time-Out Form completed by RN and CRESCENCIO.

## 2023-08-22 LAB
ERYTHROCYTE [DISTWIDTH] IN BLOOD BY AUTOMATED COUNT: 12.6 % (ref 10–15)
HCT VFR BLD AUTO: 37.9 % (ref 35–47)
HGB BLD-MCNC: 12.7 G/DL (ref 11.7–15.7)
MCH RBC QN AUTO: 31.4 PG (ref 26.5–33)
MCHC RBC AUTO-ENTMCNC: 33.5 G/DL (ref 31.5–36.5)
MCV RBC AUTO: 94 FL (ref 78–100)
PLATELET # BLD AUTO: 85 10E3/UL (ref 150–450)
RBC # BLD AUTO: 4.05 10E6/UL (ref 3.8–5.2)
WBC # BLD AUTO: 19.6 10E3/UL (ref 4–11)

## 2023-08-22 PROCEDURE — 120N000012 HC R&B POSTPARTUM

## 2023-08-22 PROCEDURE — 85027 COMPLETE CBC AUTOMATED: CPT | Performed by: STUDENT IN AN ORGANIZED HEALTH CARE EDUCATION/TRAINING PROGRAM

## 2023-08-22 PROCEDURE — 250N000013 HC RX MED GY IP 250 OP 250 PS 637: Performed by: STUDENT IN AN ORGANIZED HEALTH CARE EDUCATION/TRAINING PROGRAM

## 2023-08-22 PROCEDURE — 36415 COLL VENOUS BLD VENIPUNCTURE: CPT | Performed by: STUDENT IN AN ORGANIZED HEALTH CARE EDUCATION/TRAINING PROGRAM

## 2023-08-22 RX ADMIN — IBUPROFEN 800 MG: 400 TABLET ORAL at 12:17

## 2023-08-22 RX ADMIN — DOCUSATE SODIUM 100 MG: 100 CAPSULE, LIQUID FILLED ORAL at 22:11

## 2023-08-22 RX ADMIN — ACETAMINOPHEN 650 MG: 325 TABLET, FILM COATED ORAL at 18:21

## 2023-08-22 RX ADMIN — BENZOCAINE: 11.4 AEROSOL, SPRAY TOPICAL at 04:31

## 2023-08-22 RX ADMIN — ACETAMINOPHEN 650 MG: 325 TABLET, FILM COATED ORAL at 12:17

## 2023-08-22 RX ADMIN — IBUPROFEN 800 MG: 400 TABLET ORAL at 18:21

## 2023-08-22 RX ADMIN — ACETAMINOPHEN 650 MG: 325 TABLET, FILM COATED ORAL at 06:33

## 2023-08-22 RX ADMIN — IBUPROFEN 800 MG: 400 TABLET ORAL at 06:34

## 2023-08-22 RX ADMIN — IBUPROFEN 800 MG: 400 TABLET ORAL at 00:53

## 2023-08-22 RX ADMIN — DOCUSATE SODIUM 100 MG: 100 CAPSULE, LIQUID FILLED ORAL at 10:04

## 2023-08-22 RX ADMIN — ACETAMINOPHEN 650 MG: 325 TABLET, FILM COATED ORAL at 00:53

## 2023-08-22 ASSESSMENT — ACTIVITIES OF DAILY LIVING (ADL)
ADLS_ACUITY_SCORE: 19
ADLS_ACUITY_SCORE: 24
ADLS_ACUITY_SCORE: 19
ADLS_ACUITY_SCORE: 24
ADLS_ACUITY_SCORE: 18
ADLS_ACUITY_SCORE: 19
ADLS_ACUITY_SCORE: 19
ADLS_ACUITY_SCORE: 18
ADLS_ACUITY_SCORE: 18
ADLS_ACUITY_SCORE: 19

## 2023-08-22 NOTE — LACTATION NOTE
Routine Lactation visit with Marie, significant other Desmond & baby girl Katherine. Marie shared baby fed well right after she was born, but has since had attempts at the breast and was spitty overnight. Offered support and reassurance. Offered to try feeding as Katherine was awake at time of visit. Helped Desmond to change diaper, then placed her at right breast, and attempted in both football and cross cradle hold. Katherine would cry at the breast but wouldn't suck, then fell asleep. Encouraged skin to skin and hand expression. Discussed we'd expect Katherine to start waking and feeding better overnight tonight. If not feeding well after 24 hours, discussed starting pumping with feeding attempts.    Reviewed milk supply and engorgement. Encouraged to review Breastfeeding section in Your Guide to Postpartum & [place] [facility]. Discussed typical  feeding patterns, cluster feeding, and ways to wake a sleepy baby for feedings.    Feeding plan: Recommend unlimited, frequent breast feedings: At least 8 - 12 times every 24 hours. Start pumping if feedings are not going well after 24 hours. Avoid pacifiers and supplementation with formula unless medically indicated. Encouraged use of feeding log and to record feedings, and void/stool patterns. Marie has a pump for home use.  Encouraged to call with needs, will revisit as needed. Marie & Desmond very appreciative of visit.    Dominique Méndez, RN-C, IBCLC, MNN, PHN, BSN

## 2023-08-22 NOTE — PROGRESS NOTES
[facility] Obstetrics Post-Partum Progress Note          Assessment and Plan:    Assessment:   Post-partum day #1  Normal spontaneous vaginal delivery  L&D complications: Prolonged second stage  Type 3a laceration      Doing well.  No excessive bleeding  Pain well-controlled.      Plan:   Continue postpartum cares  Continue to work on breastfeeding.           Interval History:   Doing well after some rest. Pain is improved.           Significant Problems:    ITP stable platelets          Review of Systems:    The Review of Systems is negative other than noted in the HPI          Medications:     Current Facility-Administered Medications   Medication    acetaminophen (TYLENOL) tablet 650 mg    benzocaine (AMERICAINE) 20 % topical spray    carboprost (HEMABATE) injection 250 mcg    docusate sodium (COLACE) capsule 100 mg    hydrocortisone (Perianal) (ANUSOL-HC) 2.5 % cream    ketorolac (TORADOL) injection 30 mg    Or    ketorolac (TORADOL) injection 30 mg    Or    ibuprofen (ADVIL/MOTRIN) tablet 800 mg    ibuprofen (ADVIL/MOTRIN) tablet 800 mg    lanolin cream    lidocaine 1 % 0.1-20 mL    methylergonovine (METHERGINE) injection 200 mcg    misoprostol (CYTOTEC) tablet 400 mcg    Or    misoprostol (CYTOTEC) tablet 800 mcg    nalbuphine (NUBAIN) injection 2.5-5 mg    No MMR Needed - Assessment: Patient does not need MMR vaccine    No Tdap Needed - Assessment: Patient does not need Tdap vaccine    oxytocin (PITOCIN) 30 units in 500 mL 0.9% NaCl infusion    oxytocin (PITOCIN) 30 units in 500 mL 0.9% NaCl infusion    oxytocin (PITOCIN) injection 10 Units    oxytocin (PITOCIN) injection 10 Units    tranexamic acid 1 g in 100 mL NS IV bag (premix)             Physical Exam:   Patient Vitals for the past 24 hrs:   BP Temp Temp src Pulse Resp SpO2   08/22/23 1215 98/48 98  F (36.7  C) Oral 84 16 --   08/22/23 0810 90/46 97.9  F (36.6  C) Oral 82 16 --   08/22/23 0634 -- -- -- -- 16 --   08/22/23 0633 -- -- --  -- 16 --   08/22/23 0500 101/64 98  F (36.7  C) -- 79 16 --   08/22/23 0100 99/55 -- -- -- -- --   08/22/23 0045 100/57 -- -- -- -- --   08/22/23 0030 100/63 -- -- -- -- --   08/22/23 0015 100/57 -- -- -- -- --   08/22/23 0000 113/59 98  F (36.7  C) Temporal -- -- --   08/21/23 2345 107/54 -- -- -- -- --   08/21/23 2330 102/53 -- -- -- -- --   08/21/23 2315 105/55 -- -- -- -- --   08/21/23 2300 110/61 98.5  F (36.9  C) Temporal -- -- --   08/21/23 2245 108/58 -- -- -- -- --   08/21/23 2230 106/57 -- -- -- -- --   08/21/23 2215 110/58 -- -- -- -- --   08/21/23 2121 -- 98.7  F (37.1  C) Temporal -- -- --   08/21/23 2020 -- 98.4  F (36.9  C) Temporal -- -- --   08/21/23 1900 108/56 98.6  F (37  C) Temporal -- 16 --   08/21/23 1800 119/74 98  F (36.7  C) Temporal -- 16 98 %   08/21/23 1700 -- 97.6  F (36.4  C) Temporal -- 16 --   08/21/23 1600 105/54 98.1  F (36.7  C) Temporal -- 16 --   08/21/23 1500 112/67 98.1  F (36.7  C) Temporal -- 16 94 %   08/21/23 1430 -- -- -- -- -- 96 %   08/21/23 1407 93/51 98.3  F (36.8  C) Temporal -- 16 --   08/21/23 1400 -- -- -- -- -- 94 %   08/21/23 1330 -- -- -- -- -- 94 %     Uterine fundus is firm, non-tender and at the level of the umbilicus          Data:      Latest Reference Range & Units 08/21/23 07:46 08/21/23 10:22 08/22/23 00:11   Platelet Count 150 - 450 10e3/uL 89 (L) 98 (L) 85 (L)   (L): Data is abnormally low  Amira Schmidt MD

## 2023-08-22 NOTE — PROGRESS NOTES
Labor Progress Note    Marie Ball      MRN: [id number]   Age: 32 year old YOB: 1990   Date of Admission: 2023    Subjective:  Some discomfort with epidural.    Objective:  Patient Vitals for the past 24 hrs:   BP Temp Temp src Resp SpO2   23 -- 98.4  F (36.9  C) Temporal -- --   23 1900 108/56 98.6  F (37  C) Temporal 16 --   23 1800 119/74 98  F (36.7  C) Temporal 16 98 %   23 1700 -- 97.6  F (36.4  C) Temporal 16 --   23 1600 105/54 98.1  F (36.7  C) Temporal 16 --   23 1500 112/67 98.1  F (36.7  C) Temporal 16 94 %   23 1430 -- -- -- -- 96 %   23 1407 93/51 98.3  F (36.8  C) Temporal 16 --   23 1400 -- -- -- -- 94 %   23 1330 -- -- -- -- 94 %   23 1300 -- -- -- -- 97 %   23 1249 93/55 97.9  F (36.6  C) Temporal 16 97 %   23 1200 -- -- -- -- 96 %   23 1148 -- -- -- -- 96 %   23 1146 104/63 97.8  F (36.6  C) Temporal 16 92 %   23 1143 103/65 -- -- -- 97 %   23 1139 101/59 -- -- -- --   23 1138 -- -- -- -- 96 %   23 1137 103/59 -- -- -- --   23 1135 103/60 -- -- -- --   23 1133 101/60 -- -- -- 97 %   23 1130 105/61 -- -- 16 97 %   23 1129 104/60 -- -- -- --   23 1127 105/63 -- -- -- --   23 1125 104/61 -- -- -- --   23 1123 103/62 -- -- -- 97 %   23 1121 106/64 -- -- -- --   23 1119 110/64 -- -- -- --   23 1118 -- -- -- -- 98 %   23 1115 -- -- -- -- 92 %   23 1113 -- -- -- -- 97 %   23 0700 115/65 98.2  F (36.8  C) Temporal -- --     Gen: Well-appearing, NAD  Pulm: Breathing comfortably on room air  Abd: Soft, gravid, non-tender  Ext: trace LE edema b/l    FHT: , moderate with periods of  minimal variability, no accels, decels with pushing  [place]:  Contractions q2 mins      Assessment/Plan:  Ms. Marie Ball is a 32 year old  at 38w3d admitted for labor.    Labor:  - Complete,  pushing since 1815, now +1-+2 station. OP position noted. Offered manual rotation after discussion of risks, patient agreed. Fetus easily rotated to OA. Improvement in descent with pushing. 1st degree actively bleeding laceration noted, controlled with pressure.   - Periods of no contractions. Pitocin started at 1930.   - Reviewed labor progress, fetal position, and fetal status with patient and partner. If no progress at 4h of pushing, recommend CS. Discussed that fetal status may necessitate CS prior to 4 hours. After manual rotation, good progress in descent noted. Changed to closed knees pushing. Will continue to push. Reassess progress in one hour.     ITP: plt stable, 98. Continue to monitor. Discussed risk of hemorrahge. Will have all meds available except TXA due to hx DVT               - No VAVD     - Hx DVT: plan postpartum lovenox pending bleeding, plt.     FWB:  - Category II, non-reactive tracing. Continuing to make progress in labor.  - no operative delivery due to ITP       Iris Mcgraw MD, S  OB/GYN  8/21/2023

## 2023-08-22 NOTE — L&D DELIVERY NOTE
OB Vaginal Delivery Note    Marie Ball MRN# [id number]   Age: 32 year old YOB: 1990       GA: 38w3d  GP:   Labor Complications: Fetal Intolerance   EBL:   mL  Delivery QBL:    Delivery Type: Vaginal, Spontaneous   ROM to Delivery Time: (Delivered) Hours: 9 Minutes: 27  [place] Weight:     1 Minute 5 Minute 10 Minute   Apgar Totals: 7   9        ZOEY DARBY;LORENE HERRERA;TANISHA PIKE     Delivery Details:  Ms. Marie Ball is a 32 year old now  at 38w3d, admitted for early labor.  Pregnancy c/b ITP and hx of provoked DVT. On admission, platelets were noted to be 89, and improved to 98 during admission. Epidural placed for pain control. GBS negative, no antibiotics.  She progressed to complete cervical dilation. Pitocin was started due to ineffective contraction pattern. OP presentation was identified. At hour 3 of pushing attempt was made to manually rotate fetus. This was initially successful, however fetus verted back to OP. Fetal heart rate was category II with fetal tachycardia to the 190s-200s and periods of minimal and absent variability. She pushed for nearly 4 hours with excellent maternal effort in various positions. Liveborn female infant delivered via  at 2207 on 23 weight pending. Clear amniotic fluid with terminal meconium. Shoulders delivered easily. Infant with spontaneous cry with APGARs of 7 and 9 at 1 and 5 minutes. Placed on mother's chest. Placenta delivered with gentle cord traction; noted to be intact with 3 vessel cord. Right labial laceration and 3a perineal tears, repaired. The repair of the 3a laceration was complex due to excessive bleeding, depth, and length of laceration..  The length of the laceration was 3.5cm and 2cmdeep. The vaginal mucosa, submucosal tissue, and superficial layer of anal sphincter muscle were involved, and the skin of the perineum. The wound was not cleaned. The anal sphincter was reinforced with a figure of  X 3-0 Vicryl stitch. The remainder of the laceration was repaired in the usual manner using 3-0 Vicryl. The bleeding right labial laceration was repaired in a running locked manner. Fundus firm and lochia minimal at the end of the case. QBL pending, EBL 400mL cc.       Iris Mcgraw MD, [facility]  Ob/Gyn   2023         Flores Ball-Marie [[id number]]      Labor Event Times      Latent labor onset date/time: 2023 0600    Active labor onset date: 23 Onset time:  3:00 PM CDT   Dilation complete date: 23 Complete time:  5:30 PM   Start pushing date/time: 2023 1811          Labor Length      1st Stage (hrs): 2 (min): 30   2nd Stage (hrs): 4 (min): 37   3rd Stage (hrs): 0 (min): 3          Labor Events     labor?: No  Labor Type: Augmentation, AROM  Predominate monitoring during 1st stage: continuous electronic fetal monitoring     Antibiotics received during labor?: No       Rupture date/time: 23 1240   Rupture type: Artificial Rupture of Membranes  Fluid color: Clear     Augmentation: AROM, Oxytocin  Augmentation date/time: 23 1240   Indications for augmentation: Ineffective Contraction Pattern, Fetal Heart Rate or Rhythm Abnormality       Delivery/Placenta Date and Time      Delivery Date: 23 Delivery Time: 10:07 PM   Placenta Date/Time: 2023 10:10 PM  Oxytocin given at the time of delivery: after delivery of baby  Delivering clinician: Iris Mcgraw MD   Other personnel present at delivery:  Provider Role   Giulia Otero Kelsey L, Arlen Huertas RN              Vaginal Counts       Initial count performed by 2 team members:  Two Team Members   Dr. Mariluz Otero, RN         [place] Suture Needles Sponges (RETIRED) Instruments   Initial counts 2 0 5    Added to count  3 5    Relief counts       Final counts               Placed during labor Accounted for at the end of labor   FSE NA    IUPC NA    Cervidil NA                               Apgars    Living status: Living   1 Minute 5 Minute 10 Minute 15 Minute 20 Minute   Skin color: 0  1       Heart rate: 2  2       Reflex irritability: 2  2       Muscle tone: 1  2       Respiratory effort: 2  2       Total: 7  9       Apgars assigned by: GLENDY HOLLOWAY       Cord      Vessels: 3 Vessels    Cord Complications: None               Cord Blood Disposition: Discard    Gases Sent?: Yes    Delayed cord clamping?: Yes    Cord Clamping Delay (seconds): 31-60 seconds    Stem cell collection?: No           Stamps Resuscitation    Methods: None       Skin to Skin and Feeding Plan      Skin to skin initiation date/time: 1841    Skin to skin with: Mother  Skin to skin end date/time:            Labor Events and Shoulder Dystocia    Fetal Tracing Prior to Delivery: Category 2  Shoulder dystocia present?: Neg       Delivery (Maternal) (Provider to Complete) ([id number])    Episiotomy: None  Perineal lacerations: 3rd Repaired?: Yes     Labial laceration: right Repaired?: Yes   Repair suture: 3-0 Vicryl  Genital tract inspection done: Pos       Blood Loss  Mother: Marie Ball #[id number]     Start of Mother's Information      Delivery Blood Loss  23 1500 - 23 2243      None                 End of Mother's Information  Mother: Marie Ball #[id number]                Delivery - Provider to Complete ([id number])    Delivering clinician: Iris Mcgraw MD  Delivery Type (Choose the 1 that will go to the Birth History): Vaginal, Spontaneous                         Other personnel:  Provider Role   Giulia Oteor Kelsey L, RN Drewek, Lacy J, RN                     Placenta    Date/Time: 2023 10:10 PM  Removal: Expressed  Disposition: Hospital disposal             Anesthesia    Method: Epidural                    Presentation and Position    Presentation: Vertex     Occiput Posterior                     Iris Mcgraw MD

## 2023-08-22 NOTE — PROGRESS NOTES
Labor Progress Note    Marie Ball      MRN: [id number]   Age: 32 year old YOB: 1990   Date of Admission: 2023    Subjective:  Feels well with epidural in place.     Objective:  Patient Vitals for the past 24 hrs:   BP Temp Temp src Resp SpO2   23 1900 108/56 98.6  F (37  C) Temporal 16 --   23 1800 119/74 98  F (36.7  C) Temporal 16 98 %   23 1700 -- 97.6  F (36.4  C) Temporal 16 --   23 1600 105/54 98.1  F (36.7  C) Temporal 16 --   23 1500 112/67 98.1  F (36.7  C) Temporal 16 94 %   23 1430 -- -- -- -- 96 %   23 1407 93/51 98.3  F (36.8  C) Temporal 16 --   23 1400 -- -- -- -- 94 %   23 1330 -- -- -- -- 94 %   23 1300 -- -- -- -- 97 %   23 1249 93/55 97.9  F (36.6  C) Temporal 16 97 %   23 1200 -- -- -- -- 96 %   23 1148 -- -- -- -- 96 %   23 1146 104/63 97.8  F (36.6  C) Temporal 16 92 %   23 1143 103/65 -- -- -- 97 %   23 1139 101/59 -- -- -- --   23 1138 -- -- -- -- 96 %   23 1137 103/59 -- -- -- --   23 1135 103/60 -- -- -- --   23 1133 101/60 -- -- -- 97 %   23 1130 105/61 -- -- 16 97 %   23 1129 104/60 -- -- -- --   23 1127 105/63 -- -- -- --   23 1125 104/61 -- -- -- --   23 1123 103/62 -- -- -- 97 %   23 1121 106/64 -- -- -- --   23 1119 110/64 -- -- -- --   23 1118 -- -- -- -- 98 %   23 1115 -- -- -- -- 92 %   23 1113 -- -- -- -- 97 %   23 0700 115/65 98.2  F (36.8  C) Temporal -- --     Gen: Well-appearing, NAD  Pulm: Breathing comfortably on room air  Abd: Soft, gravid, non-tender  Ext: trace LE edema b/l    FHT: -180, moderate with periods of  minimal variability, + accels, decels with pushing  [place]:  Contractions q2 mins      Assessment/Plan:  Ms. Marie Ball is a 32 year old  at 38w3d admitted for labor.    Labor:  - Complete, pushing since , now +1 station.   -  Periods of no contractions. Pitocin started at 1930.     ITP: plt stable, 98. Continue to monitor. Discussed risk of hemorrahge. Will have all meds available except TXA due to hx DVT               - No VAVD     - Hx DVT: plan postpartum lovenox pending bleeding, plt.     FWB:  - Category II, reactive tracing. Continuing to make progress in labor  - no operative delivery due to ITP       Iris Mcgraw MD, [facility]  OB/GYN  8/21/2023

## 2023-08-22 NOTE — PROGRESS NOTES
Data: Marie Ball transferred to [facility] via wheelchair at 0130. Baby transferred via parent's arms.  Action: Receiving unit notified of transfer: Yes. Patient and family notified of room change. Report given to Aliyah at 0130. Belongings sent to receiving unit. Accompanied by Registered Nurse. Oriented patient to surroundings. Call light within reach. ID bands double-checked with receiving RN.  Response: Patient tolerated transfer and is stable.    Patient still numb from epidural and unable to ambulate to BR. Geno steady device utilized. Patient unable to void at this time. Pt was straight cathed prior to delivery so she still has time to attempt to void again. See flow sheets and delivery summary for further details.

## 2023-08-22 NOTE — PLAN OF CARE
Goal Outcome Evaluation:  Pt, support person, and infant transferred to unit at 0126 accompanied by labor RN.  ID bands double verified.  Pt oriented to room and call light placed within reach.  Safety protocols including band checks, safe sleep practices, and bulb syringe use reviewed. Pt verbally acknowledged understanding of teaching. Encouraged to call w/questions or concerns. VSS on RA.  Fundus firm, midline, U/ 2 .  Scant lochia rubra. Pt has been straight cathed. Pt was able to void earlier, but was unable to empty bladder fully.  Up with one assist.  Pain managed w/Tylenol and Ibuprofen.  Pt breastfeeding with assistance.  Bonding well w/infant.  Encouraging independence w/infant cares.  Spouse supportive at bedside.  Nursing to continue to monitor.

## 2023-08-22 NOTE — PLAN OF CARE
Goal Outcome Evaluation:  VSS, breastfeeding, fundus is firm at U/1, scant flow, no clots.  Pain controlled with Tylenol and Ibuprofen.  Independent with cares.   is at bedside and supportive. Will continue to monitor and support.

## 2023-08-23 VITALS
OXYGEN SATURATION: 98 % | HEART RATE: 70 BPM | RESPIRATION RATE: 16 BRPM | DIASTOLIC BLOOD PRESSURE: 60 MMHG | WEIGHT: 150.9 LBS | SYSTOLIC BLOOD PRESSURE: 98 MMHG | BODY MASS INDEX: 28.51 KG/M2 | TEMPERATURE: 97.7 F

## 2023-08-23 LAB
ERYTHROCYTE [DISTWIDTH] IN BLOOD BY AUTOMATED COUNT: 13.2 % (ref 10–15)
HCT VFR BLD AUTO: 31.6 % (ref 35–47)
HGB BLD-MCNC: 10.3 G/DL (ref 11.7–15.7)
MCH RBC QN AUTO: 31.8 PG (ref 26.5–33)
MCHC RBC AUTO-ENTMCNC: 32.6 G/DL (ref 31.5–36.5)
MCV RBC AUTO: 98 FL (ref 78–100)
PLATELET # BLD AUTO: 83 10E3/UL (ref 150–450)
RBC # BLD AUTO: 3.24 10E6/UL (ref 3.8–5.2)
WBC # BLD AUTO: 10.5 10E3/UL (ref 4–11)

## 2023-08-23 PROCEDURE — 85027 COMPLETE CBC AUTOMATED: CPT | Performed by: OBSTETRICS & GYNECOLOGY

## 2023-08-23 PROCEDURE — 250N000013 HC RX MED GY IP 250 OP 250 PS 637: Performed by: STUDENT IN AN ORGANIZED HEALTH CARE EDUCATION/TRAINING PROGRAM

## 2023-08-23 PROCEDURE — 250N000011 HC RX IP 250 OP 636: Mod: JZ | Performed by: OBSTETRICS & GYNECOLOGY

## 2023-08-23 PROCEDURE — 36415 COLL VENOUS BLD VENIPUNCTURE: CPT | Performed by: OBSTETRICS & GYNECOLOGY

## 2023-08-23 RX ORDER — ACETAMINOPHEN 325 MG/1
650 TABLET ORAL EVERY 4 HOURS PRN
Qty: 60 TABLET | Refills: 0 | Status: SHIPPED | OUTPATIENT
Start: 2023-08-23 | End: 2023-10-12

## 2023-08-23 RX ORDER — IBUPROFEN 800 MG/1
800 TABLET, FILM COATED ORAL EVERY 8 HOURS PRN
Qty: 60 TABLET | Refills: 0 | Status: SHIPPED | OUTPATIENT
Start: 2023-08-23 | End: 2023-10-12

## 2023-08-23 RX ORDER — ENOXAPARIN SODIUM 100 MG/ML
40 INJECTION SUBCUTANEOUS EVERY 24 HOURS
Qty: 16.8 ML | Refills: 0 | Status: SHIPPED | OUTPATIENT
Start: 2023-08-23 | End: 2023-10-04

## 2023-08-23 RX ORDER — DOCUSATE SODIUM 100 MG/1
100 CAPSULE, LIQUID FILLED ORAL 2 TIMES DAILY
Qty: 90 CAPSULE | Refills: 0 | Status: SHIPPED | OUTPATIENT
Start: 2023-08-23 | End: 2023-10-12

## 2023-08-23 RX ORDER — ENOXAPARIN SODIUM 100 MG/ML
40 INJECTION SUBCUTANEOUS EVERY 24 HOURS
Status: DISCONTINUED | OUTPATIENT
Start: 2023-08-23 | End: 2023-08-23 | Stop reason: HOSPADM

## 2023-08-23 RX ADMIN — ACETAMINOPHEN 650 MG: 325 TABLET, FILM COATED ORAL at 11:21

## 2023-08-23 RX ADMIN — IBUPROFEN 800 MG: 400 TABLET ORAL at 11:21

## 2023-08-23 RX ADMIN — ENOXAPARIN SODIUM 40 MG: 40 INJECTION SUBCUTANEOUS at 11:06

## 2023-08-23 RX ADMIN — IBUPROFEN 800 MG: 400 TABLET ORAL at 00:24

## 2023-08-23 RX ADMIN — ACETAMINOPHEN 650 MG: 325 TABLET, FILM COATED ORAL at 00:24

## 2023-08-23 RX ADMIN — DOCUSATE SODIUM 100 MG: 100 CAPSULE, LIQUID FILLED ORAL at 08:37

## 2023-08-23 ASSESSMENT — ACTIVITIES OF DAILY LIVING (ADL)
ADLS_ACUITY_SCORE: 18

## 2023-08-23 NOTE — LACTATION NOTE
"Family discharging today, discussed their feeding plan upon going home.    Marie states infant \"doesn't like to breastfeed\". Further sharing, \"she cries as soon as she comes to the breast and then if we can get her to calm down on the breast, she just falls asleep\". They have been feeding her formula in a bottle.  Infant is not even 2 days old at this point.  offered encouragement and support to Marie. Marie would like to, in the least, feed infant her breast milk. Encouraged Marie to pump every time infant takes a bottle to stimulate her milk supply. Marie has an [employer] pump for home use, we discussed the benefits of a more traditional breast pump for early stimulation and milk building. Family is looking at a [employer] pump as well.    Family is following at [facility], LUANNE looked on the website, and it doesn't appear they have outpatient lactation. Offered our [place] Lactation Resource handout for additional options upon discharge.    Discussed physiology of milk production from colostrum through milk \"coming in\". Typically women begin to feel changes to their breasts between day 3-5.  Discussed normal infant weight loss and when infant should be back to birth weight. Stressed the importance of continuing to track infant's feeds and void/stools patterns, at least until infant has returned to his birth weight.    Suggested \"Guide to Postpartum and  Care\" handbook is a great resource going forward for topics that include engorgement, plugged milk ducts, mastitis, safe sleep, and safety of baby.     Feeding plan recommendations: provide unlimited, on-demand breast feedings: At least 8-12 times/24 hours (reviewed early feeding cues). Suggested pumping if baby has a poor feeding or if supplementation is necessary. Encouraged on-going use of a feeding log or brice to record feedings along with void/stool patterns. Follow up with Pediatrician as requested and encouraged lactation follow " up. Reviewed [place] outpatient lactation resources. Appreciative of visit.    Margarita Wu RN, IBCLC               Stable

## 2023-08-23 NOTE — PROGRESS NOTES
[facility] Obstetrics Post-Partum Progress Note          Assessment and Plan:    Assessment:   Post-partum day #2  Normal spontaneous vaginal delivery  L&D complications: Type 3a perineal laceration  ITP      Doing well.  No excessive bleeding  Pain well-controlled.  Stable platelet level.      Plan:   Will start Lovenox here and plan to continue for 6 weeks per recommendations from Hematology  Ok to discharge today.   Anticipatory guidance regarding bowel regimen was discussed.           Interval History:   Doing ok this morning. Still having rectal pain with bowel movement attempts.  is not latching well but is taking the bottle without issue. Taking formula.           Significant Problems:    As above          Review of Systems:    The Review of Systems is negative other than noted in the HPI          Medications:   acetaminophen, benzocaine-menthol, carboprost, hydrocortisone (Perianal), ketorolac **OR** ketorolac **OR** ibuprofen, ibuprofen, lanolin, lidocaine (buffered or not buffered), methylergonovine, misoprostol **OR** misoprostol, nalbuphine, - MEDICATION INSTRUCTIONS -, - MEDICATION INSTRUCTIONS -, oxytocin in 0.9% NaCl, oxytocin in 0.9% NaCl, oxytocin, oxytocin, tranexamic acid          Physical Exam:   Patient Vitals for the past 24 hrs:   BP Temp Temp src Pulse Resp Weight   23 0900 -- -- -- -- -- 68.4 kg (150 lb 14.4 oz)   23 0842 98/60 97.7  F (36.5  C) Oral 70 16 --   23 0014 99/53 98.3  F (36.8  C) Oral 79 16 --   23 1927 108/65 97.8  F (36.6  C) Axillary 69 16 --   23 1531 101/58 97.8  F (36.6  C) Oral 83 16 --   23 1215 98/48 98  F (36.7  C) Oral 84 16 --     Uterine fundus is firm, non-tender and at the level of the umbilicus       Amira Schmidt MD

## 2023-08-23 NOTE — ANESTHESIA POSTPROCEDURE EVALUATION
Patient: Marie Ball    Procedure: * No procedures listed *       Anesthesia Type:  Epidural    Note:  Disposition: Admission   Postop Pain Control: Uneventful            Sign Out: Well controlled pain   PONV: No   Neuro/Psych: Uneventful            Sign Out: Acceptable/Baseline neuro status   Airway/Respiratory: Uneventful            Sign Out: Acceptable/Baseline resp. status   CV/Hemodynamics: Uneventful            Sign Out: Acceptable CV status   Other NRE: NONE   DID A NON-ROUTINE EVENT OCCUR? No    Event details/Postop Comments:  Patient doing well, ambulating without difficulty, denies any HA, paresthesias or complications associated with the epidural.            Last vitals:  Vitals:    08/22/23 1531 08/22/23 1927 08/23/23 0014   BP: 101/58 108/65 99/53   Pulse: 83 69 79   Resp: 16 16 16   Temp: 36.6  C (97.8  F) 36.6  C (97.8  F) 36.8  C (98.3  F)   SpO2:          Electronically Signed By: Farshad Ann MD  August 23, 2023  6:07 AM

## 2023-08-23 NOTE — PLAN OF CARE
Goal Outcome Evaluation:  Vital signs stable. Postpartum assessment WDL. Pain controlled with Tylenol & Ibuprofen. Patient voiding without difficulty. Breastfeeding q 3 hours, pumping, EBM & supplementing with formula d/t infant only attempted at breast & cries whenever she is put to breast. Patient and infant bonding well. Will continue with current plan of care.

## 2023-08-23 NOTE — LACTATION NOTE
Lactation visit to assist with using breast pump for first time. Baby had attempted at breast but was too sleepy to latch. Reviewed pump settings and initiate mode. Marie felt pump was uncomfortable when suction was turned up even beyond 1 drop, so requested suction be set to lowest possible level. Started with 24mm flanges and they appear to be the correct fit.     Discussed feeding plan at length with parents, as they were concerned that baby is hungry and wondered about giving some formula. Baby sleeping peacefully while Marie was pumping. Discussed with lack of feeding and hunger cues, baby is not showing signs she wants to eat. No supplementation is currently medically indicated. Suggested if parents still would like to supplement, we will support however they choose to feed baby. Marie appreciative of visit and Lactation support.    Dominique Méndez, RN-C, IBCLC, MNN, PHN, BSN

## 2023-08-23 NOTE — PLAN OF CARE
Goal Outcome Evaluation: Pt doing well with infant cares. Holding and feeding baby. States pain is well managed.Hoping to go home today.      Plan of Care Reviewed With: patient, spouse    Overall Patient Progress: improvingOverall Patient Progress: improving

## 2023-08-26 ENCOUNTER — NURSE TRIAGE (OUTPATIENT)
Dept: NURSING | Facility: CLINIC | Age: 33
End: 2023-08-26
Payer: COMMERCIAL

## 2023-08-26 NOTE — TELEPHONE ENCOUNTER
Clinic Action Needed: Yes, pt requesting to hear back from her care team on whether or not she can use lactation tea (Pink Stork) for breastfeeding.     She is currently taking Lovenox  Docusate Sodium  Ibuprofen  Tylenol  Prenatal    RN not able to find a reliable resource on this question.     I did recommend that pt contact her pharmacist today but unsure if she will do this.     Pt would like a response message sent to her [employer].     Edyta Carmen RN  [place] Nurse Advisor  8/26/2023 12:57 PM   Reason for Disposition   [1] Caller has medicine question about med NOT prescribed by PCP AND [2] triager unable to answer question (e.g., compatibility with other med, storage)    Additional Information   Negative: [1] Intentional drug overdose AND [2] suicidal thoughts or ideas   Negative: MORE THAN A DOUBLE DOSE of a prescription or over-the-counter (OTC) drug   Negative: [1] DOUBLE DOSE (an extra dose or lesser amount) of prescription drug AND [2] any symptoms (e.g., dizziness, nausea, pain, sleepiness)   Negative: [1] DOUBLE DOSE (an extra dose or lesser amount) of over-the-counter (OTC) drug AND [2] any symptoms (e.g., dizziness, nausea, pain, sleepiness)   Negative: Took another person's prescription drug   Negative: [1] DOUBLE DOSE (an extra dose or lesser amount) of prescription drug AND [2] NO symptoms  (Exception: A double dose of antibiotics.)   Negative: Diabetes drug error or overdose (e.g., took wrong type of insulin or took extra dose)   Negative: [1] Prescription not at pharmacy AND [2] was prescribed by PCP recently (Exception: Triager has access to EMR and prescription is recorded there. Go to Home Care and confirm for pharmacy.)   Negative: [1] Pharmacy calling with prescription question AND [2] triager unable to answer question   Negative: [1] Caller has URGENT medicine question about med that PCP or specialist prescribed AND [2] triager unable to answer question   Negative: Medicine patch  causing local rash or itching    Protocols used: Medication Question Call-A-AH

## 2023-08-28 ENCOUNTER — MYC MEDICAL ADVICE (OUTPATIENT)
Dept: OBGYN | Facility: CLINIC | Age: 33
End: 2023-08-28

## 2023-08-28 NOTE — TELEPHONE ENCOUNTER
Drugs: Severity: Documentation: Summary:    FENUGREEK -- ENOXAPARIN SODIUM    Moderate   Fair Concurrent use of FENUGREEK and LOW MOLECULAR WEIGHT HEPARINS may result in increased risk of bleeding.

## 2023-08-30 NOTE — TELEPHONE ENCOUNTER
Ok to take. She is on a prophylactic dose of lovenox and did not have surgery. If she notices more vaginal bleeding then she can stop the tea

## 2023-08-30 NOTE — TELEPHONE ENCOUNTER
Looks as though lactation tea interacts with lovenox. Thoughts? Speak with lactation consultant?    Routing to  to review and advise    Janna Woods RN on 8/30/2023 at 9:19 AM

## 2023-09-11 ENCOUNTER — TELEPHONE (OUTPATIENT)
Dept: OBGYN | Facility: CLINIC | Age: 33
End: 2023-09-11
Payer: COMMERCIAL

## 2023-09-11 NOTE — TELEPHONE ENCOUNTER
Routing to schedulers and  to see if we can get patient in for ov.    Janna Woods RN on 9/11/2023 at 4:39 PM

## 2023-09-11 NOTE — TELEPHONE ENCOUNTER
Reason for call:  Symptom   Symptom or request: Pt has had a little bit of vaginal pain, a lot of vaginal burning, and also some discharge that is slightly reddish/pink tinged.   Duration (how long have symptoms been present): vaginal sx's for about 4-5 days  Have you been treated for this before? No    Additional comments: Also baby was dx with thrush about a week ago and pt was told to reach out to her provider because it could effect her as well since she is breast feeding.    Phone number to reach patient:  Cell number on file:    Telephone Information:   Mobile [phone]       Best Time:  any    Can we leave a detailed message on this number?  YES    Travel screening: Not Applicable

## 2023-09-11 NOTE — TELEPHONE ENCOUNTER
23  3rd degree laceration  3wks PP  -Right labial laceration and 3a perineal tears, repaired. The repair of the 3a laceration was complex due to excessive bleeding, depth, and length of laceration..  The length of the laceration was 3.5cm and 2cmdeep. The vaginal mucosa, submucosal tissue, and superficial layer of anal sphincter muscle were involved, and the skin of the perineum. The wound was not cleaned. The anal sphincter was reinforced with a figure of X 3-0 Vicryl stitch. The remainder of the laceration was repaired in the usual manner using 3-0 Vicryl. The bleeding right labial laceration was repaired in a running locked manner     Vagina pain is lingering: burning and pain but not necessary to take any tyl/ibu. Some pink discharge on occasion at time of BR use. No odor.  Nothing in the vagina.  Has not tried warm tub bath at the advice of Dr Rodriguse at the time of discharge.  Has not looked at it.  Reassurance given as could just be a normal healing process/sensation.    Recommended warm tub bath soaks now as should be healing and apply Tucks pad for the burning BUT will send this to Dr Schmidt to advise further - do you want her to come in and be evaluated or is this normal (as RN advised)?    Geno Perry RN on 2023 at 12:59 PM

## 2023-09-11 NOTE — TELEPHONE ENCOUNTER
Dr Mcgraw did her delivery and repair. Can we see if she has better availability to perform an exam to ensure no infection as she is at high risk due to having a third degree?

## 2023-09-15 ENCOUNTER — TELEPHONE (OUTPATIENT)
Dept: OBGYN | Facility: CLINIC | Age: 33
End: 2023-09-15
Payer: COMMERCIAL

## 2023-09-15 DIAGNOSIS — B37.0 THRUSH: Primary | ICD-10-CM

## 2023-09-15 DIAGNOSIS — N64.4 NIPPLE PAIN: ICD-10-CM

## 2023-09-15 RX ORDER — FLUCONAZOLE 200 MG/1
TABLET ORAL
Qty: 16 TABLET | Refills: 0 | Status: SHIPPED | OUTPATIENT
Start: 2023-09-15 | End: 2023-10-12

## 2023-09-15 NOTE — TELEPHONE ENCOUNTER
Multiple encounter from 9/15/2023 regarding this concern.  Patient also forgot to mention before that she has a milk bleb on nipple that is causing more discomfort of nipple. This has popped open. Discussed applying a warm compress to the area. Can apply warm compress prior to pumping. And can apply cold compress after pumping for any swelling or discomfort. Also can take ibuprofen/tylenol for discomfort and ointment that was prescribed will help to heal the nipples. Discussed calling clinic with any concerns such as fever, worsening pain or symptoms.     Janna Woods RN on 9/15/2023 at 2:08 PM\

## 2023-09-15 NOTE — TELEPHONE ENCOUNTER
Reason for call:  Other   Patient called regarding (reason for call): call back; questions regarding medications  Additional comments: Patient would like a call back regarding her medication. She said her insurance only covers 2 of the medications so she wants to know if she needs to take all 3 medications.     Phone number to reach patient:  Cell number on file:    Telephone Information:   Mobile [phone]       Best Time:  Any     Can we leave a detailed message on this number?  YES    Travel screening: Not Applicable

## 2023-09-15 NOTE — TELEPHONE ENCOUNTER
Updated pt that she needs both treatments for proper resolution of the yeast.  Discussed use of medication and instructions.  Pt verbalized understanding, in agreement with plan, and voiced no further questions.  David Bennett RN on 9/15/2023 at 12:55 PM

## 2023-09-15 NOTE — TELEPHONE ENCOUNTER
Delivered 8/21/23  Pt calling with knowledge that her infant has thrush that she is getting treated for.  Pt is noticing pain at her nipples that feels like pins and needles, sharp pain.  Lactation consultant told pt to call for proper treatment    [facility] [place] for pharmacy  Routing pt mychart message to provider to advise.  David Bennett RN on 9/15/2023 at 9:47 AM

## 2023-09-15 NOTE — TELEPHONE ENCOUNTER
Reason for call:  Other   Patient called regarding (reason for call): call back  Additional comments: patient is having problems breast feeding her baby. Has blisters on her breasts, might be mastitis.    Phone number to reach patient:  Cell number on file:    Telephone Information:   Mobile [phone]     Best Time:  any    Can we leave a detailed message on this number?  YES    Travel screening: Not Applicable

## 2023-09-22 ENCOUNTER — TELEPHONE (OUTPATIENT)
Dept: OBGYN | Facility: CLINIC | Age: 33
End: 2023-09-22
Payer: COMMERCIAL

## 2023-09-22 NOTE — TELEPHONE ENCOUNTER
Taking Lovenox and has low milk supply and asking about Fenugreek supplement if it interacts with Lovenox.    Unable to answer as not enough data/studies to say w 100% confidence that it does not interact w Lovenox. Recommended oatmeal twice daily and follow other recommendations at this time.    Pt verbalized understanding, in agreement with plan, and voiced no further questions.    Geno Perry RN on 9/22/2023 at 1:04 PM

## 2023-09-22 NOTE — TELEPHONE ENCOUNTER
[employer] message sent with lactation information  Rachel Mathias RN on 9/22/2023 at 12:02 PM

## 2023-09-30 ENCOUNTER — NURSE TRIAGE (OUTPATIENT)
Dept: NURSING | Facility: CLINIC | Age: 33
End: 2023-09-30
Payer: COMMERCIAL

## 2023-09-30 NOTE — TELEPHONE ENCOUNTER
S-(situation): breast pain and fullness    B-(background):   Symptoms started yesterday.   Usually pumps every 3 hours, but was not able to pump overnight for 7 hours.    A-(assessment):   Moderate breast pain and fullness, both breasts  Nipple pain  No fever  No redness      R-(recommendations): Home care    Take Tylenol 1000 mg q8h PRN for pain    TREATMENT FOR LOCALIZED PAIN OR LUMP IN YOUR BREAST:   * Clogged milk ducts or infection can cause localized, hard areas, swelling, or tenderness in your breast(s). Here is some care advice that should help.  * HEAT: Put a warm wet washcloth on the area for 10 to 15 minutes, three times a day. Another option is to take a pleasantly hot shower.  * LYMPHATIC MASSAGE: Gently massage your breast, like petting a cat, moving the hand towards the armpit area.    CALL BACK IF:   * Fever 100.4 F (38.0 C) or higher or redness occurs  * Lump does not go away within 1 week   * You become worse    Pt verbalized understanding.    Pat Naqvi RN/[place] Nurse Advisor        Reason for Disposition   [1] Breast lump AND [2] present < 7 days    Additional Information   Negative: Sounds like a life-threatening emergency to the triager   Negative: Breastfeeding questions about baby   Negative: Breastfeeding questions about mother's medicines and diet   Negative: [1] Breast symptoms (e.g., lump, pain, redness, swelling) AND [2] mother plans to continue breastfeeding or pumping   Negative: [1] Breast symptoms AND [2] stopped breastfeeding and pumping > 4 weeks ago   Negative: [1] SEVERE breast pain (e.g., excruciating) AND [2] fever > 103 F (39.4 C)   Negative: Patient sounds very sick or weak to the triager   Negative: [1] Breast looks infected (e.g., spreading redness, feels hot or painful to touch) AND [2] fever 100.4 F (38.0 C) or higher   Negative: [1] SEVERE pain AND [2] not improved after 2 hours of pain medicine and Care Advice   Negative: [1] Breast looks infected (e.g.,  spreading redness) AND [2] no fever   Negative: [1] Breast or nipple pain AND [2] present > 7 days   Negative: [1] Breast lump AND [2] present > 7 days   Negative: [1] Normal postpartum breast pain and engorgement AND [2] chose not to breastfeed and / or pump   Negative: [1] Normal postpartum breast pain and fullness AND [2] stopped breastfeeding and / or pumping in past 7 days    Protocols used: Postpartum - Breast Pain and Vyzhicxzldk-S-SJ

## 2023-10-09 NOTE — PROGRESS NOTES
"SUBJECTIVE:  Marie Ball,  is here for a postpartum visit.  She had a  on 2023 delivering a healthy baby girl, named Brooke weighing 6 lbs 13.7 oz at term.      HPI:  Marie presents for postpartum follow up. She is having low milk supply. She is only sleeping in two 3 hour segments. She is feeling tired most of the time. She has experienced weight gain since delivery due to feeding from relatives. She would like a CBC today and also a Lipid check although she is not fasting today. She does not plan to use anything for contraception. She used the Lovenox for DVT prophylaxis until a few days ago.     Last PHQ-9 score on record=       10/12/2023     2:42 PM   PHQ-9 SCORE   PHQ-9 Total Score 5         2023     8:05 AM   TAE-7 SCORE   Total Score 3 (minimal anxiety)   Total Score 3       Pap:  Last pap 10/20/2021 NIL Neg HPV, results from [facility]    Delivery complications:  Yes, fetal intolerance  Breast feeding:  Yes, pumping and formula  Bladder problems:  No  Bowel problems/hemorrhoids:  No  Episiotomy/laceration/incision healed? Yes: 3rd degree tear  Vaginal flow:  None  [place]:  No  Contraception: none  Emotional adjustment:  doing well, happy, tired, and still with pain/ discomfort from delivery  Back to work:  2023    OBJECTIVE:  Vitals: /58 (BP Location: Right arm)   Ht 1.537 m (5' 0.5\")   Wt 68 kg (150 lb)   LMP 2022   Breastfeeding Yes   BMI 28.81 kg/m    BMI= Body mass index is 28.81 kg/m .  General - pleasant female in no acute distress.  Breast -  no masses. No clogged ducts on palpation. Bilateral nipples erythematous but not concerning for infection.  Abdomen - No incision  Pelvic - EG: normal adult female, BUS: within normal limits, Vagina: well rugated, no discharge, granulation tissue seen at posterior fourchette. Silver nitrate applied Cervix: no lesions   Rectovaginal - deferred.    ASSESSMENT:    ICD-10-CM    1. Routine " postpartum follow-up  Z39.2 CBC with platelets     CBC with platelets      2. Lipid screening  Z13.220 Lipid Profile (Chol, Trig, HDL, LDL calc)     Lipid Profile (Chol, Trig, HDL, LDL calc)          PLAN:  May resume normal activities without restrictions.  Pap smear was not done today.  Full counseling was provided, and all questions were answered.   Return to clinic in one year for an annual visit.     Patient Instructions    Mupirocin (Bactroban) 2% Ointment (10gms), Nystatin 100,000 units/ml ointment (10gms), Betamethasone 0.1% ointment (10gms), Clortrimazole 1% (10gms)     Amira Schmidt MD

## 2023-10-12 ENCOUNTER — PRENATAL OFFICE VISIT (OUTPATIENT)
Dept: OBGYN | Facility: CLINIC | Age: 33
End: 2023-10-12
Payer: COMMERCIAL

## 2023-10-12 VITALS
DIASTOLIC BLOOD PRESSURE: 58 MMHG | SYSTOLIC BLOOD PRESSURE: 106 MMHG | HEIGHT: 61 IN | BODY MASS INDEX: 28.32 KG/M2 | WEIGHT: 150 LBS

## 2023-10-12 DIAGNOSIS — Z13.220 LIPID SCREENING: ICD-10-CM

## 2023-10-12 LAB
CHOLEST SERPL-MCNC: 178 MG/DL
ERYTHROCYTE [DISTWIDTH] IN BLOOD BY AUTOMATED COUNT: 11.7 % (ref 10–15)
HCT VFR BLD AUTO: 39.5 % (ref 35–47)
HDLC SERPL-MCNC: 61 MG/DL
HGB BLD-MCNC: 13.3 G/DL (ref 11.7–15.7)
LDLC SERPL CALC-MCNC: 87 MG/DL
MCH RBC QN AUTO: 31.1 PG (ref 26.5–33)
MCHC RBC AUTO-ENTMCNC: 33.7 G/DL (ref 31.5–36.5)
MCV RBC AUTO: 93 FL (ref 78–100)
NONHDLC SERPL-MCNC: 117 MG/DL
PLATELET # BLD AUTO: 173 10E3/UL (ref 150–450)
RBC # BLD AUTO: 4.27 10E6/UL (ref 3.8–5.2)
TRIGL SERPL-MCNC: 149 MG/DL
WBC # BLD AUTO: 6.3 10E3/UL (ref 4–11)

## 2023-10-12 PROCEDURE — 36415 COLL VENOUS BLD VENIPUNCTURE: CPT | Performed by: OBSTETRICS & GYNECOLOGY

## 2023-10-12 PROCEDURE — 85027 COMPLETE CBC AUTOMATED: CPT | Performed by: OBSTETRICS & GYNECOLOGY

## 2023-10-12 PROCEDURE — 80061 LIPID PANEL: CPT | Performed by: OBSTETRICS & GYNECOLOGY

## 2023-10-12 PROCEDURE — 99207 PR POST PARTUM EXAM: CPT | Performed by: OBSTETRICS & GYNECOLOGY

## 2023-10-12 ASSESSMENT — ANXIETY QUESTIONNAIRES
5. BEING SO RESTLESS THAT IT IS HARD TO SIT STILL: NOT AT ALL
2. NOT BEING ABLE TO STOP OR CONTROL WORRYING: MORE THAN HALF THE DAYS
6. BECOMING EASILY ANNOYED OR IRRITABLE: SEVERAL DAYS
IF YOU CHECKED OFF ANY PROBLEMS ON THIS QUESTIONNAIRE, HOW DIFFICULT HAVE THESE PROBLEMS MADE IT FOR YOU TO DO YOUR WORK, TAKE CARE OF THINGS AT HOME, OR GET ALONG WITH OTHER PEOPLE: NOT DIFFICULT AT ALL
3. WORRYING TOO MUCH ABOUT DIFFERENT THINGS: SEVERAL DAYS
7. FEELING AFRAID AS IF SOMETHING AWFUL MIGHT HAPPEN: SEVERAL DAYS

## 2023-10-12 ASSESSMENT — PATIENT HEALTH QUESTIONNAIRE - PHQ9
5. POOR APPETITE OR OVEREATING: NOT AT ALL
SUM OF ALL RESPONSES TO PHQ QUESTIONS 1-9: 5

## 2023-10-12 NOTE — PATIENT INSTRUCTIONS
Mupirocin (Bactroban) 2% Ointment (10gms), Nystatin 100,000 units/ml ointment (10gms), Betamethasone 0.1% ointment (10gms), Clortrimazole 1% (10gms)

## 2023-12-23 ENCOUNTER — HEALTH MAINTENANCE LETTER (OUTPATIENT)
Age: 33
End: 2023-12-23

## 2025-01-12 ENCOUNTER — HEALTH MAINTENANCE LETTER (OUTPATIENT)
Age: 35
End: 2025-01-12